# Patient Record
Sex: FEMALE | Race: WHITE | HISPANIC OR LATINO | Employment: UNEMPLOYED | URBAN - METROPOLITAN AREA
[De-identification: names, ages, dates, MRNs, and addresses within clinical notes are randomized per-mention and may not be internally consistent; named-entity substitution may affect disease eponyms.]

---

## 2019-09-06 LAB
ABO GROUP BLD: NORMAL
HBV SURFACE AG SERPL QL IA: NEGATIVE
HIV 1+2 AB+HIV1 P24 AG SERPL QL IA: NON REACTIVE
RUBV IGG SERPL IA-ACNC: NORMAL

## 2019-12-03 ENCOUNTER — INITIAL PRENATAL (OUTPATIENT)
Dept: OBGYN | Facility: CLINIC | Age: 29
End: 2019-12-03
Payer: COMMERCIAL

## 2019-12-03 VITALS — WEIGHT: 179 LBS | SYSTOLIC BLOOD PRESSURE: 107 MMHG | DIASTOLIC BLOOD PRESSURE: 70 MMHG

## 2019-12-03 DIAGNOSIS — Z34.80 SUPERVISION OF OTHER NORMAL PREGNANCY: ICD-10-CM

## 2019-12-03 PROCEDURE — 59401 PR NEW OB VISIT: CPT | Performed by: NURSE PRACTITIONER

## 2019-12-03 RX ORDER — ONDANSETRON 4 MG/1
4 TABLET, FILM COATED ORAL EVERY 4 HOURS PRN
Qty: 30 TAB | Refills: 5 | Status: SHIPPED | OUTPATIENT
Start: 2019-12-03 | End: 2020-01-02

## 2019-12-03 RX ORDER — ONDANSETRON 4 MG/1
4 TABLET, ORALLY DISINTEGRATING ORAL EVERY 6 HOURS PRN
Status: ON HOLD | COMMUNITY
End: 2020-03-07

## 2019-12-03 SDOH — HEALTH STABILITY: MENTAL HEALTH: HOW OFTEN DO YOU HAVE A DRINK CONTAINING ALCOHOL?: NEVER

## 2019-12-03 NOTE — PROGRESS NOTES
Subjective:   Gisela Dykes is a 29 y.o.  who presents for her new OB exam.  She is 25w3d with an DOROTHY of Estimated Date of Delivery: 3/14/20 by LMP she was tracking and is sure of. She is feeling well and has no concerns at this time. Denies VB, LOF, contractions or pain. No ER visits or previous care in this pregnancy. Denies dysuria, vaginal DC, fever. Reports fetal movement.  AFP was normal [place].  Declines CF.  Started care in [place], [place] at around 8 weeks.     History reviewed. No pertinent past medical history.    Psych Hx: Patient denies any history of depression, anxiety, PTSD, bipolar or any other psychological issues.     History reviewed. No pertinent surgical history.     OB History    Para Term  AB Living   2         1   SAB TAB Ectopic Molar Multiple Live Births             1      # Outcome Date GA Lbr Osvaldo/2nd Weight Sex Delivery Anes PTL Lv   2 Current            1  18   3.118 kg (6 lb 14 oz) M Vag-Spont EPI          Gynecological Hx: Denies any hx of STIs, including HSV. Denies any vulvovaginal disorders and no hx of abnormal cervical cytology. Last pap WNL a year ago.     Sexual Hx: One current male partner, who is FOB     Contraceptive Hx: Has used pills in the past and has since discontinued use.     History reviewed. No pertinent family history.     Denies any genetic disorders in family history.     Social History     Socioeconomic History   • Marital status: Single     Spouse name: Not on file   • Number of children: Not on file   • Years of education: Not on file   • Highest education level: Not on file   Occupational History   • Not on file   Social Needs   • Financial resource strain: Not on file   • Food insecurity:     Worry: Not on file     Inability: Not on file   • Transportation needs:     Medical: Not on file     Non-medical: Not on file   Tobacco Use   • Smoking status: Never Smoker   • Smokeless tobacco: Never Used   Substance and Sexual Activity   •  Alcohol use: Not Currently     Frequency: Never   • Drug use: Not on file   • Sexual activity: Yes     Partners: Male   Lifestyle   • Physical activity:     Days per week: Not on file     Minutes per session: Not on file   • Stress: Not on file   Relationships   • Social connections:     Talks on phone: Not on file     Gets together: Not on file     Attends Confucianist service: Not on file     Active member of club or organization: Not on file     Attends meetings of clubs or organizations: Not on file     Relationship status: Not on file   • Intimate partner violence:     Fear of current or ex partner: Not on file     Emotionally abused: Not on file     Physically abused: Not on file     Forced sexual activity: Not on file   Other Topics Concern   • Not on file   Social History Narrative   • Not on file       FOB is involved and lives with Gisela Dykes.  Pregnancy is planned.    She is currently as seasonal at [facility], denies any heavy lifting or exposure to potential teratogens like environmental or occupational toxins.   Denies alcohol use, drug use, or tobacco use in pregnancy.   Denies any current or hx of sexual, emotional or physical abuse or trauma.     Current Medications: PNV, zofran and reglan PRN  Allergies: Denies allergies to medications, food, or environmental allergies.     Objective:      Vitals:    12/03/19 1043   BP: 107/70   Weight: 81.2 kg (179 lb)        See Prenatal Physical and Prenatal Vitals  UA WNL today      Assessment:      1.  IUP @ 25w3d per LMP      2.  S=D      3.  See problem list as follows       Patient Active Problem List    Diagnosis Date Noted   • Supervision of other normal pregnancy 12/03/2019         Plan:   - GC/CT & pap records requested from [place]  - Refill on Zofran   - Prenatal labs ordered - lab slip given  - Discussed PNV, nutrition, adequate water intake, and exercise/weight gain in pregnancy  - NOB informational packet with anticipatory guidance given  - S/sx of  pregnancy warning signs and PTL precautions given  - Complete OB US records requested as done at 20 weeks  - Return to [facility] in 4 wks

## 2019-12-03 NOTE — PROGRESS NOTES
Pt. Here for NOB visit today.  #[phone]  First prenatal care  Pt. States doing well  Pharmacy verified  Declined AFP

## 2019-12-30 ENCOUNTER — HOSPITAL ENCOUNTER (OUTPATIENT)
Dept: LAB | Facility: MEDICAL CENTER | Age: 29
End: 2019-12-30
Attending: NURSE PRACTITIONER
Payer: COMMERCIAL

## 2019-12-30 DIAGNOSIS — Z34.80 SUPERVISION OF OTHER NORMAL PREGNANCY: ICD-10-CM

## 2019-12-30 LAB
ERYTHROCYTE [DISTWIDTH] IN BLOOD BY AUTOMATED COUNT: 41.3 FL (ref 35.9–50)
GLUCOSE 1H P 50 G GLC PO SERPL-MCNC: 154 MG/DL (ref 70–139)
HCT VFR BLD AUTO: 38.1 % (ref 37–47)
HGB BLD-MCNC: 12.7 G/DL (ref 12–16)
MCH RBC QN AUTO: 29.7 PG (ref 27–33)
MCHC RBC AUTO-ENTMCNC: 33.3 G/DL (ref 33.6–35)
MCV RBC AUTO: 89.2 FL (ref 81.4–97.8)
PLATELET # BLD AUTO: 275 K/UL (ref 164–446)
PMV BLD AUTO: 10.6 FL (ref 9–12.9)
RBC # BLD AUTO: 4.27 M/UL (ref 4.2–5.4)
TREPONEMA PALLIDUM IGG+IGM AB [PRESENCE] IN SERUM OR PLASMA BY IMMUNOASSAY: NON REACTIVE
WBC # BLD AUTO: 8 K/UL (ref 4.8–10.8)

## 2019-12-30 PROCEDURE — 85027 COMPLETE CBC AUTOMATED: CPT

## 2019-12-30 PROCEDURE — 36415 COLL VENOUS BLD VENIPUNCTURE: CPT

## 2019-12-30 PROCEDURE — 82950 GLUCOSE TEST: CPT

## 2019-12-30 PROCEDURE — 86780 TREPONEMA PALLIDUM: CPT

## 2019-12-31 ENCOUNTER — ROUTINE PRENATAL (OUTPATIENT)
Dept: OBGYN | Facility: CLINIC | Age: 29
End: 2019-12-31
Payer: COMMERCIAL

## 2019-12-31 VITALS — WEIGHT: 180 LBS | SYSTOLIC BLOOD PRESSURE: 100 MMHG | DIASTOLIC BLOOD PRESSURE: 70 MMHG

## 2019-12-31 DIAGNOSIS — R73.09 ELEVATED GLUCOSE: ICD-10-CM

## 2019-12-31 DIAGNOSIS — Z34.80 SUPERVISION OF OTHER NORMAL PREGNANCY: Primary | ICD-10-CM

## 2019-12-31 PROCEDURE — 90040 PR PRENATAL FOLLOW UP: CPT | Performed by: NURSE PRACTITIONER

## 2019-12-31 NOTE — PROGRESS NOTES
S) Pt is a 29 y.o.   at 29w3d  gestation. Routine prenatal care today. Pt reports some intermittent dizziness especially when she is standing for long periods of time.  Resolves when she sits down.  Denies SOB, chest pain or other symptoms. Discussed normalcy of this during pregnancy.    Fetal movement Normal  Cramping no  VB no  LOF no   Denies dysuria. Generally feels well today. Good self-care activities identified. Denies headaches, swelling, visual changes, or epigastric pain .     O) /70   Wt 81.6 kg (180 lb)         Labs: WNL       PNL: WNL       GCT: elevated 1 hour       AFP: negative       GBS: n/a       Pertinent ultrasound - see media  Anatomy scan WNL           A) IUP at 29w3d       S=D         Patient Active Problem List    Diagnosis Date Noted   • Supervision of other normal pregnancy 2019          SVE: deferred         TDAP: no       FLU: no       BTL: no       : n/a       C/S Consent: n/a       IOL or C/S scheduled: no       LAST PAP:          P) s/s ptl vs general discomforts. Fetal movements reviewed. General ed and anticipatory guidance. Nutrition/exercise/vitamin.   Discussed increasing water intake, eating small frequent snacks through day and rest to help with dizziness.  Declined TDAP  RTC 2 weeks or PRN.

## 2019-12-31 NOTE — LETTER
"Count Your Baby's Movements  Another step to a healthy delivery               Dept: [phone]    How Many Weeks Pregnant? 29w3d    Date to Begin Countin19              How to use this chart    One way for your physician to keep track of your baby's health is by knowing how often the baby moves (or \"kicks\") in your womb.  You can help your physician to do this by using this chart every day.    Every day, you should see how many hours it takes for your baby to move 10 times.  Start in the morning, as soon as you get up.    · First, write down the time your baby moves until you get to 10.  · Check off one box every time your baby moves until you get to 10.  · Write down the time you finished counting in the last column.  · Total how long it took to count up all 10 movements.  · Finally, fill in the box that shows how long this took.  After counting 10 movements, you no longer have to count any more that day.  The next morning, just start counting again as soon as you get up.    What should you call a \"movement\"?  It is hard to say, because it will feel different from one mother to another and from one pregnancy to the next.  The important thing is that you count the movements the same way throughout your pregnancy.  If you have more questions, you should ask your physician.    Count carefully every day!  SAMPLE:  Week 28    How many hours did it take to feel 10 movements?       Start  Time     1     2     3     4     5     6     7     8     9     10   Finish Time   Mon 8:20 ·  ·  ·  ·  ·  ·  ·  ·  ·  ·  11:40                  Sat               Sun                 IMPORTANT: You should contact your physician if it takes more than two hours for you to feel 10 movements.  Each morning, write down the time and start to count the movements of your baby.  Keep track by checking off one box every time you feel one movement.  When you have felt 10 \"kicks\", " write down the time you finished counting in the last column.  Then fill in the   box (over the check chelita) for the number of hours it took.  Be sure to read the complete instructions on the previous page.

## 2019-12-31 NOTE — PROGRESS NOTES
Pt here today for OB follow up  Pt states she has been dizzy for a month and a half  Reports +FM  Good # [phone]  Pharmacy Confirmed.  Chaperone offered and declined,  Declined Tdap   K/C given

## 2020-01-10 ENCOUNTER — TELEPHONE (OUTPATIENT)
Dept: OBGYN | Facility: CLINIC | Age: 30
End: 2020-01-10

## 2020-01-10 NOTE — TELEPHONE ENCOUNTER
Pt notified of elevated 1 hr GTT. Informed pt she would need a 3 hr GTT for further evaluation. Instructed pt that with this test she would need to be fasting, but could drink water. Let her know she would not be able to leave lab during test and she could take a snack for after test was completed. Pt had no further questions or concerns.   ----- Message from NIRMAL Simon sent at 12/31/2019  7:56 AM PST -----  Pt needs three hour. Ordered

## 2020-01-13 ENCOUNTER — HOSPITAL ENCOUNTER (OUTPATIENT)
Dept: LAB | Facility: MEDICAL CENTER | Age: 30
End: 2020-01-13
Attending: NURSE PRACTITIONER
Payer: COMMERCIAL

## 2020-01-13 DIAGNOSIS — R73.09 ELEVATED GLUCOSE: ICD-10-CM

## 2020-01-13 LAB
GLUCOSE 1H P CHAL SERPL-MCNC: 129 MG/DL (ref 65–180)
GLUCOSE 2H P CHAL SERPL-MCNC: 113 MG/DL (ref 65–155)
GLUCOSE 3H P CHAL SERPL-MCNC: 113 MG/DL (ref 65–140)
GLUCOSE BS SERPL-MCNC: 78 MG/DL (ref 65–95)

## 2020-01-13 PROCEDURE — 82952 GTT-ADDED SAMPLES: CPT

## 2020-01-13 PROCEDURE — 36415 COLL VENOUS BLD VENIPUNCTURE: CPT

## 2020-01-13 PROCEDURE — 82951 GLUCOSE TOLERANCE TEST (GTT): CPT

## 2020-01-14 ENCOUNTER — ROUTINE PRENATAL (OUTPATIENT)
Dept: OBGYN | Facility: CLINIC | Age: 30
End: 2020-01-14
Payer: COMMERCIAL

## 2020-01-14 ENCOUNTER — HOSPITAL ENCOUNTER (OUTPATIENT)
Facility: MEDICAL CENTER | Age: 30
End: 2020-01-14
Attending: NURSE PRACTITIONER
Payer: COMMERCIAL

## 2020-01-14 VITALS — WEIGHT: 179 LBS | HEART RATE: 86 BPM | DIASTOLIC BLOOD PRESSURE: 74 MMHG | SYSTOLIC BLOOD PRESSURE: 108 MMHG

## 2020-01-14 DIAGNOSIS — Z34.80 SUPERVISION OF OTHER NORMAL PREGNANCY: ICD-10-CM

## 2020-01-14 DIAGNOSIS — Z34.80 SUPERVISION OF OTHER NORMAL PREGNANCY: Primary | ICD-10-CM

## 2020-01-14 PROCEDURE — 87491 CHLMYD TRACH DNA AMP PROBE: CPT

## 2020-01-14 PROCEDURE — 90040 PR PRENATAL FOLLOW UP: CPT | Performed by: NURSE PRACTITIONER

## 2020-01-14 PROCEDURE — 87591 N.GONORRHOEAE DNA AMP PROB: CPT

## 2020-01-14 NOTE — PROGRESS NOTES
Pt here today for OB follow up  Pt states no complaints  Reports +FM  Good # [phone]  Pharmacy Confirmed.  Chaperone offered and provided.

## 2020-01-14 NOTE — PROGRESS NOTES
S) Pt is a 29 y.o.   at 31w3d  gestation. Routine prenatal care today. No complaints today. Review of previous records shows no GC/CT on file, and US shows poor visualization of LVOT. Will collect GC/CT today and repeat US. Discussed need for testing, and all questions answered. GBS at 36 weeks.  labor precautions reviewed, all questions answered.    Fetal movement Normal  Cramping no  VB no  LOF no   Denies dysuria. Generally feels well today. Good self-care activities identified. Denies headaches, swelling, visual changes, or epigastric pain .     O) /74   Pulse 86   Wt 81.2 kg (179 lb)         Labs:       PNL: WNL       GCT: 154, but 3 hour WNL        AFP: normal       GBS: N/A       Pertinent ultrasound -        Done at previous practice, see media- LVOT not seen well on exam. Repeat US ordered. Remainder of survey and MOE WNL    A) IUP at 31w3d       S=D         Patient Active Problem List    Diagnosis Date Noted   • Supervision of other normal pregnancy 2019          SVE: deferred       Chaperone offered: yes         TDAP: no       FLU: no        BTL: no       : n/a       C/S Consent: n/a       IOL or C/S scheduled: no       LAST PAP: None on file, will request records as she states last was about 1-2 years ago and normal. May need to repeat PP if none on file         P) s/s ptl vs general discomforts. Fetal movements reviewed. General ed and anticipatory guidance. Nutrition/exercise/vitamin. Plans breast Plans pp contraception- unsure  Continue PNV.

## 2020-01-15 LAB
C TRACH DNA SPEC QL NAA+PROBE: NEGATIVE
N GONORRHOEA DNA SPEC QL NAA+PROBE: NEGATIVE
SPECIMEN SOURCE: NORMAL

## 2020-02-04 ENCOUNTER — ROUTINE PRENATAL (OUTPATIENT)
Dept: OBGYN | Facility: CLINIC | Age: 30
End: 2020-02-04
Payer: COMMERCIAL

## 2020-02-04 ENCOUNTER — HOSPITAL ENCOUNTER (OUTPATIENT)
Dept: RADIOLOGY | Facility: MEDICAL CENTER | Age: 30
End: 2020-02-04
Attending: NURSE PRACTITIONER
Payer: COMMERCIAL

## 2020-02-04 VITALS — WEIGHT: 183 LBS | SYSTOLIC BLOOD PRESSURE: 120 MMHG | DIASTOLIC BLOOD PRESSURE: 72 MMHG

## 2020-02-04 DIAGNOSIS — Z34.80 SUPERVISION OF OTHER NORMAL PREGNANCY: ICD-10-CM

## 2020-02-04 PROCEDURE — 90040 PR PRENATAL FOLLOW UP: CPT | Performed by: OBSTETRICS & GYNECOLOGY

## 2020-02-04 PROCEDURE — 76815 OB US LIMITED FETUS(S): CPT

## 2020-02-04 NOTE — PROGRESS NOTES
Chief complaint: Return OB visit    S: Pt presents for routine OB follow up. Good fetal movement.  No contractions, vaginal bleeding, or leakage of fluid.    Questions answered.  No complaints    O: /72   Wt 83 kg (183 lb)   LMP 2019   Patients' weight gain, fluid intake and exercise level discussed.  Vitals, fundal height , fetal position, and FHR reviewed on flowsheet    Lab:No results found for this or any previous visit (from the past 336 hour(s)).    A/P:  29 y.o.  at 34w3d presents for routine obstetric follow-up.  Size equals dates and/or scan    1.  Continue prenatal vitamins.  2.  Fetal kick counts.  3.  Exercise at least 30 minutes daily.  4.  Drink at least 2L of water daily  5.  Labor precautions educated.  6.  Follow-up in 1 weeks.  7.  GBS 36 weeks    All questions answered    Follow-up 1 week

## 2020-02-04 NOTE — PROGRESS NOTES
Pt here today for OB follow up  Pt states some minor cramping in lower belly  Pt denies VB, LOF, and contractions   Reports +FM  Good # [phone]  Pharmacy Confirmed.   Chaperone offered and provided.

## 2020-02-05 DIAGNOSIS — Z34.80 SUPERVISION OF OTHER NORMAL PREGNANCY: ICD-10-CM

## 2020-02-06 ENCOUNTER — TELEPHONE (OUTPATIENT)
Dept: OBGYN | Facility: CLINIC | Age: 30
End: 2020-02-06

## 2020-02-06 NOTE — TELEPHONE ENCOUNTER
----- Message from Lucie Faria C.N.M. sent at 2/5/2020  7:44 AM PST -----  Please let patient know that they were able to see the heart and that looks great, but still not able to see the baby's face due to position. Will order another US to look at facial structures again. Please try to schedule this in the next 2-3 weeks    Pt notified, instructed to call to set up an appt.     Verbalized understanding

## 2020-02-11 ENCOUNTER — ROUTINE PRENATAL (OUTPATIENT)
Dept: OBGYN | Facility: CLINIC | Age: 30
End: 2020-02-11
Payer: COMMERCIAL

## 2020-02-11 VITALS — SYSTOLIC BLOOD PRESSURE: 123 MMHG | DIASTOLIC BLOOD PRESSURE: 79 MMHG | WEIGHT: 181 LBS

## 2020-02-11 DIAGNOSIS — Z34.83 ENCOUNTER FOR SUPERVISION OF OTHER NORMAL PREGNANCY IN THIRD TRIMESTER: ICD-10-CM

## 2020-02-11 PROCEDURE — 90040 PR PRENATAL FOLLOW UP: CPT | Performed by: OBSTETRICS & GYNECOLOGY

## 2020-02-11 NOTE — PROGRESS NOTES
Pt here today for OB follow up @ 35w3d  Pt states denies VB and LOF  Reports +FM  Good # [phone]  Pharmacy Confirmed.

## 2020-02-11 NOTE — PROGRESS NOTES
CARLOS:  35w3d    Pt reports doing well.   Denies vaginal bleeding, contractions, LOF.  Patient reports she had a fever on Saturday to 1015 which then spontaneously resolved.  Was associated with nausea and vomiting.  No fevers or any concerns since that time, endorses normal fetal movement now and through the episode    /79   Wt 82.1 kg (181 lb)   LMP 2019   gen: AAO, NAD  FHTs: 155  FH: 35    A/P: 29 y.o.  @ 35w3d  - PNL up to date, Rh+/-  - had f/u imaging here for non-visualized cardiac anatomy previously; face poorly visualized; has f/u US for this although discussed I do not anticipate they will be able to see well at this gestational age; could do repeat US as scheduled or defer if prefers - assess fetal face on delivery.  pt prefers to wait until delivery and avoid additional US  -Considering pills for postpartum contraception, undecided  - elevated 1hr, normal 3hr; 3rd tri CBC/T pall wnl  - declines Tdap      Reviewed labor precautions to come to L&D if with decreased fetal movement, contractions, loss of fluid, vaginal bleeding.  Also would recommend further evaluation if return of fever.      RTC 1wk, GBS that visit      Thao Jones MD  [facility], Women's Health

## 2020-02-18 ENCOUNTER — HOSPITAL ENCOUNTER (OUTPATIENT)
Facility: MEDICAL CENTER | Age: 30
End: 2020-02-18
Attending: OBSTETRICS & GYNECOLOGY
Payer: COMMERCIAL

## 2020-02-18 ENCOUNTER — ROUTINE PRENATAL (OUTPATIENT)
Dept: OBGYN | Facility: CLINIC | Age: 30
End: 2020-02-18
Payer: COMMERCIAL

## 2020-02-18 VITALS — WEIGHT: 181 LBS | DIASTOLIC BLOOD PRESSURE: 72 MMHG | SYSTOLIC BLOOD PRESSURE: 127 MMHG

## 2020-02-18 DIAGNOSIS — Z34.83 ENCOUNTER FOR SUPERVISION OF OTHER NORMAL PREGNANCY IN THIRD TRIMESTER: ICD-10-CM

## 2020-02-18 PROCEDURE — 90040 PR PRENATAL FOLLOW UP: CPT | Performed by: OBSTETRICS & GYNECOLOGY

## 2020-02-18 PROCEDURE — 87081 CULTURE SCREEN ONLY: CPT

## 2020-02-18 PROCEDURE — 87150 DNA/RNA AMPLIFIED PROBE: CPT

## 2020-02-18 NOTE — PROGRESS NOTES
29 y.o.  36w3d The patient is here for routine obstetrical followup. She reports good fetal movement. She denies contractions, vaginal bleeding, or leaking of fluid.    The patient's pregnancy is complicated by   Patient Active Problem List    Diagnosis Date Noted   • Supervision of other normal pregnancy 2019   GBS obtained      Followup in 1 week   labor precautions were discussed with patient  Fetal kick counts were discussed with patient

## 2020-02-19 ENCOUNTER — APPOINTMENT (OUTPATIENT)
Dept: OTHER | Facility: IMAGING CENTER | Age: 30
End: 2020-02-19
Payer: COMMERCIAL

## 2020-02-19 DIAGNOSIS — Z34.83 ENCOUNTER FOR SUPERVISION OF OTHER NORMAL PREGNANCY IN THIRD TRIMESTER: ICD-10-CM

## 2020-02-20 LAB — GP B STREP DNA SPEC QL NAA+PROBE: NEGATIVE

## 2020-02-26 ENCOUNTER — ROUTINE PRENATAL (OUTPATIENT)
Dept: OBGYN | Facility: CLINIC | Age: 30
End: 2020-02-26
Payer: COMMERCIAL

## 2020-02-26 VITALS — WEIGHT: 181 LBS | DIASTOLIC BLOOD PRESSURE: 73 MMHG | SYSTOLIC BLOOD PRESSURE: 115 MMHG

## 2020-02-26 DIAGNOSIS — Z34.80 SUPERVISION OF OTHER NORMAL PREGNANCY: ICD-10-CM

## 2020-02-26 PROCEDURE — 90040 PR PRENATAL FOLLOW UP: CPT | Performed by: OBSTETRICS & GYNECOLOGY

## 2020-02-26 NOTE — PROGRESS NOTES
CARLOS:  37w4d    Pt reports doing well.  Denies vaginal bleeding, contractions, LOF.  Reports +FM.    LMP 2019   gen: AAO, NAD  FHTs: 135  FH: 38    Prenatal labs 2019  H/H 14  Platelets 268  Blood type a positive  RPR nonreactive  Hep B nonreactive  Rubella immune  HIV nonreactive  Ultrasound at 13 weeks 5 days with normal nuchal translucency    A/P: 29 y.o.  @ 37w4d   #Prenatal care. PNL up to date, Rh+, glucola/3rd tri CBC.  Prenatal labs as noted above   # FWB.  anatomy US wnl, FH appropriate, FHT + today, continue kick counts  # Labor precautions discussed  #Delivery plan: await spontaneous labor, discussed induction at 41 weeks with has not gone into labor prior.  Not yet scheduled   #Postpartum planning.  plans to BF, PPBCM discussed, will likely want IUD.  #RTC 2wks

## 2020-02-26 NOTE — PROGRESS NOTES
Pt here today for OB follow up  Pt states no complaints   Reports +FM  Good # [phone]  Pharmacy Confirmed.  Chaperone offered and provided.

## 2020-03-03 ENCOUNTER — ROUTINE PRENATAL (OUTPATIENT)
Dept: OBGYN | Facility: CLINIC | Age: 30
End: 2020-03-03
Payer: COMMERCIAL

## 2020-03-03 VITALS — DIASTOLIC BLOOD PRESSURE: 85 MMHG | SYSTOLIC BLOOD PRESSURE: 129 MMHG | WEIGHT: 180 LBS

## 2020-03-03 DIAGNOSIS — Z34.80 SUPERVISION OF OTHER NORMAL PREGNANCY: ICD-10-CM

## 2020-03-03 PROCEDURE — 90040 PR PRENATAL FOLLOW UP: CPT | Performed by: OBSTETRICS & GYNECOLOGY

## 2020-03-03 NOTE — PROGRESS NOTES
Pt here today for OB follow up @ 38w3d  Pt states denies VB and LOF. Pt wants to do 39wk IOL   Reports +FM  Good # [phone]  Pharmacy Confirmed.  Labs up to date

## 2020-03-03 NOTE — PROGRESS NOTES
Chief complaint: Return OB visit    S: Pt presents for routine OB follow up. Good fetal movement.  No contractions, vaginal bleeding, or leakage of fluid.    Questions answered.    O: /85   Wt 81.6 kg (180 lb)   LMP 2019   Patients' weight gain, fluid intake and exercise level discussed.  Vitals, fundal height , fetal position, and FHR reviewed on flowsheet    Lab:  Recent Results (from the past 336 hour(s))   GRP B STREP, BY PCR (ROQUE BROTH)    Collection Time: 20  5:31 PM   Result Value Ref Range    Strep Gp B DNA PCR Negative Negative       A/P:  29 y.o.  at 38w3d presents for routine obstetric follow-up.  Size equals dates and/or scan    1.  Continue prenatal vitamins.  2.  Fetal kick counts.  3.  Exercise at least 30 minutes daily.  4.  Drink at least 2L of water daily  5.  Labor precautions educated.  6.  Follow-up in 1 weeks.  7.  GBS negative    Cervix 250/-3.  Patient requesting induction of labor due to family issues.  Reports her  is starting work very soon and he has no paternity leave available.  Request sent to labor delivery    All questions answered

## 2020-03-07 ENCOUNTER — ANESTHESIA EVENT (OUTPATIENT)
Dept: ANESTHESIOLOGY | Facility: MEDICAL CENTER | Age: 30
End: 2020-03-07
Payer: COMMERCIAL

## 2020-03-07 ENCOUNTER — ANESTHESIA (OUTPATIENT)
Dept: ANESTHESIOLOGY | Facility: MEDICAL CENTER | Age: 30
End: 2020-03-07
Payer: COMMERCIAL

## 2020-03-07 ENCOUNTER — APPOINTMENT (OUTPATIENT)
Dept: OBGYN | Facility: MEDICAL CENTER | Age: 30
End: 2020-03-07
Attending: OBSTETRICS & GYNECOLOGY
Payer: COMMERCIAL

## 2020-03-07 ENCOUNTER — HOSPITAL ENCOUNTER (INPATIENT)
Facility: MEDICAL CENTER | Age: 30
LOS: 1 days | End: 2020-03-08
Attending: OBSTETRICS & GYNECOLOGY | Admitting: OBSTETRICS & GYNECOLOGY
Payer: COMMERCIAL

## 2020-03-07 PROBLEM — Z34.90 PREGNANT: Status: ACTIVE | Noted: 2019-12-03

## 2020-03-07 LAB
BASOPHILS # BLD AUTO: 0.3 % (ref 0–1.8)
BASOPHILS # BLD: 0.02 K/UL (ref 0–0.12)
EOSINOPHIL # BLD AUTO: 0.02 K/UL (ref 0–0.51)
EOSINOPHIL NFR BLD: 0.3 % (ref 0–6.9)
ERYTHROCYTE [DISTWIDTH] IN BLOOD BY AUTOMATED COUNT: 40.8 FL (ref 35.9–50)
HCT VFR BLD AUTO: 37.5 % (ref 37–47)
HGB BLD-MCNC: 12.5 G/DL (ref 12–16)
HOLDING TUBE BB 8507: NORMAL
IMM GRANULOCYTES # BLD AUTO: 0.03 K/UL (ref 0–0.11)
IMM GRANULOCYTES NFR BLD AUTO: 0.4 % (ref 0–0.9)
LYMPHOCYTES # BLD AUTO: 2.03 K/UL (ref 1–4.8)
LYMPHOCYTES NFR BLD: 30.2 % (ref 22–41)
MCH RBC QN AUTO: 27.9 PG (ref 27–33)
MCHC RBC AUTO-ENTMCNC: 33.3 G/DL (ref 33.6–35)
MCV RBC AUTO: 83.7 FL (ref 81.4–97.8)
MONOCYTES # BLD AUTO: 0.39 K/UL (ref 0–0.85)
MONOCYTES NFR BLD AUTO: 5.8 % (ref 0–13.4)
NEUTROPHILS # BLD AUTO: 4.24 K/UL (ref 2–7.15)
NEUTROPHILS NFR BLD: 63 % (ref 44–72)
NRBC # BLD AUTO: 0 K/UL
NRBC BLD-RTO: 0 /100 WBC
PLATELET # BLD AUTO: 188 K/UL (ref 164–446)
PMV BLD AUTO: 12 FL (ref 9–12.9)
RBC # BLD AUTO: 4.48 M/UL (ref 4.2–5.4)
WBC # BLD AUTO: 6.7 K/UL (ref 4.8–10.8)

## 2020-03-07 PROCEDURE — 770002 HCHG ROOM/CARE - OB PRIVATE (112)

## 2020-03-07 PROCEDURE — 304965 HCHG RECOVERY SERVICES

## 2020-03-07 PROCEDURE — 0KQM0ZZ REPAIR PERINEUM MUSCLE, OPEN APPROACH: ICD-10-PCS | Performed by: OBSTETRICS & GYNECOLOGY

## 2020-03-07 PROCEDURE — 85025 COMPLETE CBC W/AUTO DIFF WBC: CPT

## 2020-03-07 PROCEDURE — 700111 HCHG RX REV CODE 636 W/ 250 OVERRIDE (IP): Performed by: ANESTHESIOLOGY

## 2020-03-07 PROCEDURE — 10907ZC DRAINAGE OF AMNIOTIC FLUID, THERAPEUTIC FROM PRODUCTS OF CONCEPTION, VIA NATURAL OR ARTIFICIAL OPENING: ICD-10-PCS | Performed by: OBSTETRICS & GYNECOLOGY

## 2020-03-07 PROCEDURE — 59400 OBSTETRICAL CARE: CPT | Performed by: OBSTETRICS & GYNECOLOGY

## 2020-03-07 PROCEDURE — 700111 HCHG RX REV CODE 636 W/ 250 OVERRIDE (IP)

## 2020-03-07 PROCEDURE — 700105 HCHG RX REV CODE 258

## 2020-03-07 PROCEDURE — 303615 HCHG EPIDURAL/SPINAL ANESTHESIA FOR LABOR

## 2020-03-07 PROCEDURE — 700105 HCHG RX REV CODE 258: Performed by: STUDENT IN AN ORGANIZED HEALTH CARE EDUCATION/TRAINING PROGRAM

## 2020-03-07 PROCEDURE — 700102 HCHG RX REV CODE 250 W/ 637 OVERRIDE(OP): Performed by: STUDENT IN AN ORGANIZED HEALTH CARE EDUCATION/TRAINING PROGRAM

## 2020-03-07 PROCEDURE — 59409 OBSTETRICAL CARE: CPT

## 2020-03-07 PROCEDURE — 36415 COLL VENOUS BLD VENIPUNCTURE: CPT

## 2020-03-07 PROCEDURE — 700111 HCHG RX REV CODE 636 W/ 250 OVERRIDE (IP): Performed by: STUDENT IN AN ORGANIZED HEALTH CARE EDUCATION/TRAINING PROGRAM

## 2020-03-07 PROCEDURE — 700101 HCHG RX REV CODE 250: Performed by: ANESTHESIOLOGY

## 2020-03-07 PROCEDURE — A9270 NON-COVERED ITEM OR SERVICE: HCPCS | Performed by: STUDENT IN AN ORGANIZED HEALTH CARE EDUCATION/TRAINING PROGRAM

## 2020-03-07 RX ORDER — LIDOCAINE HYDROCHLORIDE AND EPINEPHRINE 15; 5 MG/ML; UG/ML
INJECTION, SOLUTION EPIDURAL
Status: COMPLETED | OUTPATIENT
Start: 2020-03-07 | End: 2020-03-07

## 2020-03-07 RX ORDER — ROPIVACAINE HYDROCHLORIDE 2 MG/ML
INJECTION, SOLUTION EPIDURAL; INFILTRATION; PERINEURAL CONTINUOUS
Status: DISCONTINUED | OUTPATIENT
Start: 2020-03-07 | End: 2020-03-08 | Stop reason: HOSPADM

## 2020-03-07 RX ORDER — ONDANSETRON 2 MG/ML
4 INJECTION INTRAMUSCULAR; INTRAVENOUS EVERY 4 HOURS PRN
Status: DISCONTINUED | OUTPATIENT
Start: 2020-03-07 | End: 2020-03-08 | Stop reason: HOSPADM

## 2020-03-07 RX ORDER — SODIUM CHLORIDE, SODIUM LACTATE, POTASSIUM CHLORIDE, AND CALCIUM CHLORIDE .6; .31; .03; .02 G/100ML; G/100ML; G/100ML; G/100ML
1000 INJECTION, SOLUTION INTRAVENOUS
Status: DISCONTINUED | OUTPATIENT
Start: 2020-03-07 | End: 2020-03-07 | Stop reason: HOSPADM

## 2020-03-07 RX ORDER — MISOPROSTOL 200 UG/1
800 TABLET ORAL
Status: DISCONTINUED | OUTPATIENT
Start: 2020-03-07 | End: 2020-03-07 | Stop reason: HOSPADM

## 2020-03-07 RX ORDER — ACETAMINOPHEN 325 MG/1
650 TABLET ORAL EVERY 4 HOURS PRN
Status: DISCONTINUED | OUTPATIENT
Start: 2020-03-07 | End: 2020-03-08 | Stop reason: HOSPADM

## 2020-03-07 RX ORDER — SODIUM CHLORIDE, SODIUM LACTATE, POTASSIUM CHLORIDE, AND CALCIUM CHLORIDE .6; .31; .03; .02 G/100ML; G/100ML; G/100ML; G/100ML
250 INJECTION, SOLUTION INTRAVENOUS PRN
Status: DISCONTINUED | OUTPATIENT
Start: 2020-03-07 | End: 2020-03-07 | Stop reason: HOSPADM

## 2020-03-07 RX ORDER — VITAMIN A ACETATE, BETA CAROTENE, ASCORBIC ACID, CHOLECALCIFEROL, .ALPHA.-TOCOPHEROL ACETATE, DL-, THIAMINE MONONITRATE, RIBOFLAVIN, NIACINAMIDE, PYRIDOXINE HYDROCHLORIDE, FOLIC ACID, CYANOCOBALAMIN, CALCIUM CARBONATE, FERROUS FUMARATE, ZINC OXIDE, CUPRIC OXIDE 3080; 12; 120; 400; 1; 1.84; 3; 20; 22; 920; 25; 200; 27; 10; 2 [IU]/1; UG/1; MG/1; [IU]/1; MG/1; MG/1; MG/1; MG/1; MG/1; [IU]/1; MG/1; MG/1; MG/1; MG/1; MG/1
1 TABLET, FILM COATED ORAL EVERY MORNING
Status: DISCONTINUED | OUTPATIENT
Start: 2020-03-07 | End: 2020-03-08

## 2020-03-07 RX ORDER — DEXTROSE, SODIUM CHLORIDE, SODIUM LACTATE, POTASSIUM CHLORIDE, AND CALCIUM CHLORIDE 5; .6; .31; .03; .02 G/100ML; G/100ML; G/100ML; G/100ML; G/100ML
INJECTION, SOLUTION INTRAVENOUS CONTINUOUS
Status: DISCONTINUED | OUTPATIENT
Start: 2020-03-07 | End: 2020-03-08 | Stop reason: HOSPADM

## 2020-03-07 RX ORDER — CARBOPROST TROMETHAMINE 250 UG/ML
250 INJECTION, SOLUTION INTRAMUSCULAR
Status: DISCONTINUED | OUTPATIENT
Start: 2020-03-07 | End: 2020-03-07 | Stop reason: HOSPADM

## 2020-03-07 RX ORDER — VITAMIN A ACETATE, BETA CAROTENE, ASCORBIC ACID, CHOLECALCIFEROL, .ALPHA.-TOCOPHEROL ACETATE, DL-, THIAMINE MONONITRATE, RIBOFLAVIN, NIACINAMIDE, PYRIDOXINE HYDROCHLORIDE, FOLIC ACID, CYANOCOBALAMIN, CALCIUM CARBONATE, FERROUS FUMARATE, ZINC OXIDE, CUPRIC OXIDE 3080; 12; 120; 400; 1; 1.84; 3; 20; 22; 920; 25; 200; 27; 10; 2 [IU]/1; UG/1; MG/1; [IU]/1; MG/1; MG/1; MG/1; MG/1; MG/1; [IU]/1; MG/1; MG/1; MG/1; MG/1; MG/1
1 TABLET, FILM COATED ORAL EVERY MORNING
Status: CANCELLED | OUTPATIENT
Start: 2020-03-07

## 2020-03-07 RX ORDER — IBUPROFEN 800 MG/1
800 TABLET ORAL EVERY 6 HOURS PRN
Status: DISCONTINUED | OUTPATIENT
Start: 2020-03-07 | End: 2020-03-08 | Stop reason: HOSPADM

## 2020-03-07 RX ORDER — ALUMINA, MAGNESIA, AND SIMETHICONE 2400; 2400; 240 MG/30ML; MG/30ML; MG/30ML
30 SUSPENSION ORAL EVERY 6 HOURS PRN
Status: DISCONTINUED | OUTPATIENT
Start: 2020-03-07 | End: 2020-03-07 | Stop reason: HOSPADM

## 2020-03-07 RX ORDER — ROPIVACAINE HYDROCHLORIDE 2 MG/ML
INJECTION, SOLUTION EPIDURAL; INFILTRATION; PERINEURAL
Status: COMPLETED
Start: 2020-03-07 | End: 2020-03-07

## 2020-03-07 RX ORDER — METHYLERGONOVINE MALEATE 0.2 MG/ML
0.2 INJECTION INTRAVENOUS
Status: DISCONTINUED | OUTPATIENT
Start: 2020-03-07 | End: 2020-03-07 | Stop reason: HOSPADM

## 2020-03-07 RX ORDER — BUPIVACAINE HYDROCHLORIDE 2.5 MG/ML
INJECTION, SOLUTION EPIDURAL; INFILTRATION; INTRACAUDAL
Status: ACTIVE
Start: 2020-03-07 | End: 2020-03-08

## 2020-03-07 RX ORDER — SODIUM CHLORIDE, SODIUM LACTATE, POTASSIUM CHLORIDE, CALCIUM CHLORIDE 600; 310; 30; 20 MG/100ML; MG/100ML; MG/100ML; MG/100ML
INJECTION, SOLUTION INTRAVENOUS
Status: COMPLETED
Start: 2020-03-07 | End: 2020-03-07

## 2020-03-07 RX ORDER — SODIUM CHLORIDE, SODIUM LACTATE, POTASSIUM CHLORIDE, CALCIUM CHLORIDE 600; 310; 30; 20 MG/100ML; MG/100ML; MG/100ML; MG/100ML
INJECTION, SOLUTION INTRAVENOUS CONTINUOUS
Status: DISPENSED | OUTPATIENT
Start: 2020-03-07 | End: 2020-03-07

## 2020-03-07 RX ORDER — ONDANSETRON 4 MG/1
4 TABLET, ORALLY DISINTEGRATING ORAL EVERY 6 HOURS PRN
COMMUNITY
End: 2022-04-02

## 2020-03-07 RX ADMIN — OXYTOCIN 2 MILLI-UNITS/MIN: 10 INJECTION, SOLUTION INTRAMUSCULAR; INTRAVENOUS at 12:00

## 2020-03-07 RX ADMIN — LIDOCAINE HYDROCHLORIDE,EPINEPHRINE BITARTRATE 3 ML: 15; .005 INJECTION, SOLUTION EPIDURAL; INFILTRATION; INTRACAUDAL; PERINEURAL at 14:25

## 2020-03-07 RX ADMIN — OXYTOCIN 125 ML/HR: 10 INJECTION, SOLUTION INTRAMUSCULAR; INTRAVENOUS at 19:47

## 2020-03-07 RX ADMIN — SODIUM CHLORIDE, POTASSIUM CHLORIDE, SODIUM LACTATE AND CALCIUM CHLORIDE: 600; 310; 30; 20 INJECTION, SOLUTION INTRAVENOUS at 12:00

## 2020-03-07 RX ADMIN — FENTANYL CITRATE 100 MCG: 50 INJECTION, SOLUTION INTRAMUSCULAR; INTRAVENOUS at 17:31

## 2020-03-07 RX ADMIN — IBUPROFEN 800 MG: 800 TABLET, FILM COATED ORAL at 19:46

## 2020-03-07 RX ADMIN — ROPIVACAINE HYDROCHLORIDE 200 MG: 2 INJECTION, SOLUTION EPIDURAL; INFILTRATION at 14:43

## 2020-03-07 RX ADMIN — ACETAMINOPHEN 650 MG: 325 TABLET, FILM COATED ORAL at 23:08

## 2020-03-07 RX ADMIN — BUPIVACAINE HYDROCHLORIDE 10 ML: 2.5 INJECTION, SOLUTION EPIDURAL; INFILTRATION; INTRACAUDAL; PERINEURAL at 17:31

## 2020-03-07 RX ADMIN — SODIUM CHLORIDE, POTASSIUM CHLORIDE, SODIUM LACTATE AND CALCIUM CHLORIDE: 600; 310; 30; 20 INJECTION, SOLUTION INTRAVENOUS at 14:20

## 2020-03-07 RX ADMIN — SODIUM CHLORIDE, SODIUM LACTATE, POTASSIUM CHLORIDE, CALCIUM CHLORIDE AND DEXTROSE MONOHYDRATE: 5; 600; 310; 30; 20 INJECTION, SOLUTION INTRAVENOUS at 18:12

## 2020-03-07 SDOH — ECONOMIC STABILITY: FOOD INSECURITY: WITHIN THE PAST 12 MONTHS, THE FOOD YOU BOUGHT JUST DIDN'T LAST AND YOU DIDN'T HAVE MONEY TO GET MORE.: PATIENT DECLINED

## 2020-03-07 SDOH — ECONOMIC STABILITY: FOOD INSECURITY: WITHIN THE PAST 12 MONTHS, YOU WORRIED THAT YOUR FOOD WOULD RUN OUT BEFORE YOU GOT MONEY TO BUY MORE.: PATIENT DECLINED

## 2020-03-07 SDOH — ECONOMIC STABILITY: TRANSPORTATION INSECURITY
IN THE PAST 12 MONTHS, HAS LACK OF TRANSPORTATION KEPT YOU FROM MEETINGS, WORK, OR FROM GETTING THINGS NEEDED FOR DAILY LIVING?: PATIENT DECLINED

## 2020-03-07 SDOH — ECONOMIC STABILITY: TRANSPORTATION INSECURITY
IN THE PAST 12 MONTHS, HAS THE LACK OF TRANSPORTATION KEPT YOU FROM MEDICAL APPOINTMENTS OR FROM GETTING MEDICATIONS?: PATIENT DECLINED

## 2020-03-07 ASSESSMENT — PATIENT HEALTH QUESTIONNAIRE - PHQ9
SUM OF ALL RESPONSES TO PHQ9 QUESTIONS 1 AND 2: 0
2. FEELING DOWN, DEPRESSED, IRRITABLE, OR HOPELESS: NOT AT ALL
1. LITTLE INTEREST OR PLEASURE IN DOING THINGS: NOT AT ALL

## 2020-03-07 ASSESSMENT — LIFESTYLE VARIABLES
EVER_SMOKED: NEVER
ALCOHOL_USE: NO
DOES PATIENT WANT TO STOP DRINKING: NO

## 2020-03-07 ASSESSMENT — PAIN SCALES - GENERAL: PAIN_LEVEL: 0

## 2020-03-07 NOTE — ANESTHESIA PREPROCEDURE EVALUATION
Relevant Problems   Other   (+) Pregnant       Physical Exam    Airway   Mallampati: II  TM distance: >3 FB  Neck ROM: full       Cardiovascular - normal exam  Rhythm: regular  Rate: normal  (-) murmur     Dental - normal exam         Pulmonary - normal exam  Breath sounds clear to auscultation     Abdominal    Neurological - normal exam         Other findings: gravid            Anesthesia Plan    ASA 2       Plan - epidural   Neuraxial block will be labor analgesia              Pertinent diagnostic labs and testing reviewed    Informed Consent:    Anesthetic plan and risks discussed with patient.

## 2020-03-07 NOTE — H&P
History and Physical    Gisela Dykes is a 29 y.o. female  -Para:     Gestational Age:  39w0d  Admitted for:   Induction of Labor, elective  Admitted to  [facility].  Patient received prenatal care: [facility]    HPI: Patient is admitted with the above mentioned Chief Complaint and States   Loss of fluid:   negative  Abdominal Pain:  Negative, feeling increasing pressure in pelvis  Uterine Contractions:  negative  Vaginal Bleeding:  negative  Fetal Movement:  Normal    Patient denies fever, chills, nausea, vomiting , headache, visual disturbance, or dysuria  Patient's last menstrual period was 2019.  Estimated Date of Delivery: 3/14/20  Final DOROTHY: 3/14/2020, by Last Menstrual Period    Patient Active Problem List    Diagnosis Date Noted   • Supervision of other normal pregnancy 2019       Admitting DX: Pregnancy  Pregnancy Complications:  none  OB Risk Factors:   None  Labor State:    Not in labor.    History:  PMH: Denies. Takes PNV and zofran for nausea    PSH: Denies    FH: Denies    SH: Denies tobacco, alcohol, marijuana, other substance use    OB History    Para Term  AB Living   2 1 1     1   SAB TAB Ectopic Molar Multiple Live Births             1      # Outcome Date GA Lbr Osvaldo/2nd Weight Sex Delivery Anes PTL Lv   2 Current            1 Term 18   3.118 kg (6 lb 14 oz) M Vag-Spont EPI  OSLE       Medications:  No current facility-administered medications on file prior to encounter.      Current Outpatient Medications on File Prior to Encounter   Medication Sig Dispense Refill   • ondansetron (ZOFRAN ODT) 4 MG TABLET DISPERSIBLE Take 4 mg by mouth every 6 hours as needed for Nausea.     • Prenatal w/o A Vit-Fe Fum-FA (PRENATA PO) Take  by mouth.         Allergies:  Patient has no known allergies.    ROS:   Neuro: negative    Cardiovascular: negative  Gastro intestinal: negative  Genitourinary: negative            Physical  Exam:  /71   Pulse 90   Temp 36.5 °C (97.7 °F) (Temporal)   LMP 06/08/2019   Constitutional: healthy-appearing, Well-developed, well-nourished, in no acute distress  HEENT: PERRLA, EOMI, Sclera clear, anicteric and Oropharynx clear, no lesions  Breast Exam: Deferred  Cardio: regular rate and rhythm, S1, S2 normal, no murmur, click, rub or gallop  Lung: unlabored respirations, no intercostal retractions or accessory muscle use  Abdomen: abdomen is soft without significant tenderness, masses, organomegaly or guarding  Extremity: extremities, peripheral pulses and reflexes normal, no edema, redness or tenderness in the calves or thighs, feet normal, good pulses, normal color, temperature and sensation    Cervical Exam: 50%  Cervix Dilatation: 3  Station: negative 2  Pelvis: Normal  Fetal Assessment: Fetal heart variability: moderate  Fetal Heart Rate decelerations: none  Fetal Heart Rate accelerations: yes  Baseline FHR: 130 per minute  Estimated Fetal Weight: 3000 - 3500g      Labs:      Prenatal Results     General (Most Recent Result)     Test Value Date Time    ABO       Rh       Antibody screen       HbA1c       Gonorrhea Negative  01/14/20 1125    Chlamydia  by PCR Negative  01/14/20 1125    Chlamydia by DNA       RPR/Syphilus Non Reactive  12/30/19 1307    HSV 1/2 by PCR (non-serum)       HSV 1/2 (serum)       HSV 1       HSV 2       HPV (16)       HPV (18)       HPV (other)       HBsAg       HIV-1 HIV-2 Antibodies       Rubella       Tb             Pap Smear (Most Recent Result)     Test Value Date Time    Pap smear       Pap smear w/HPV       Pap smear w/CTNG       Pap smar w/HPV CTNG       Pap smear (reflex HPV ACUS)       Pap smear (reflex HPV ASCUS w/CTNG)       Pathology gyn specimen             Urinalysis (Most Recent Result)     Test Value Date Time    Urinalysis       Urinalysis (culture if indicated)       POC urinalysis       Urine drug screen       Urine drug screen (w/o conf)       Urine  culture ([id number])       Urine culture ([id number])             1st Trimester     Test Value Date Time    Hgb       Hct       Fasting Glucose Tolerance       GTT, 1 hour       GTT, 2 hours       GTT, 3 hours             2nd Trimester     Test Value Date Time    Hgb       Hct       Urinalysis       Urine Culture       AST       ALT       Uric Acid       Fasting Glucose Tolerance       GTT, 1 hour       GTT, 2 hours       GTT, 3 hours       Urine Protein/Creatinine Ratio             3rd Trimester     Test Value Date Time    Hgb 12.7 g/dL 12/30/19 1307    Hct 38.1 % 12/30/19 1307    Platelet count 275 K/uL 12/30/19 1307    GBS (ROQUE BROTH) Negative  02/18/20 1731    GBS (Direct) Negative  02/18/20 1731    Urinalysis       Urine Culture       Urine Drug Screen       Urine Protein/Creatinine Ratio             Congenital Disease Screening     Test Value Date Time    First Trimester Screen       Quad Screen       Sickle Cell       Thalassemia       [facility]       Cystic Fibrosis Carrier Study                     Assessment:  Gestational Age:  39w0d  Labor State:   IOL for elective  Risk Factors:   None  Pregnancy Complications: None  GBS neg    Patient Active Problem List    Diagnosis Date Noted   • Supervision of other normal pregnancy 12/03/2019       Plan:   Admitted for: Induction of Labor, elective  Anticipate vaginal delivery  CBC  Pitocin augmentation  Antibiotics: None indicated    John Castaneda M.D.

## 2020-03-07 NOTE — ANESTHESIA PROCEDURE NOTES
Epidural Block  Date/Time: 3/7/2020 2:25 PM  Performed by: Sj Christensen M.D.  Authorized by: Sj Christensen M.D.     Patient Location:  OB  Start Time:  3/7/2020 2:25 PM  End Time:  3/7/2020 2:40 PM  Reason for Block: labor analgesia    patient identified, IV checked, site marked, risks and benefits discussed, surgical consent, monitors and equipment checked, pre-op evaluation and timeout performed    Patient Position:  Sitting  Prep: ChloraPrep, patient draped and sterile technique    Monitoring:  Blood pressure, continuous pulse oximetry and heart rate  Approach:  Midline  Location:  L4-L5  Injection Technique:  REYES air and REYES saline  Skin infiltration:  Lidocaine  Strength:  1%  Dose:  3ml  Needle Type:  Tuohy  Needle Gauge:  17 G  Needle Length:  3.5 in  Loss of resistance::  6  Catheter Size:  19 G  Catheter at Skin Depth:  12  Test Dose Result:  Negative  Events: injection resistance     2 attempts made. 1st attempt at L3-L4 easy placement of catheter but resistance to injection of test dose. Pulled catheter back 2 cm but still difficult to inject. Removed catheter and informed patient. 2nd attempt at L4/L5 easy placement of catheter with no resistance to injection.

## 2020-03-08 VITALS
BODY MASS INDEX: 33.99 KG/M2 | OXYGEN SATURATION: 96 % | SYSTOLIC BLOOD PRESSURE: 117 MMHG | TEMPERATURE: 97.8 F | RESPIRATION RATE: 17 BRPM | HEART RATE: 71 BPM | HEIGHT: 61 IN | DIASTOLIC BLOOD PRESSURE: 80 MMHG | WEIGHT: 180 LBS

## 2020-03-08 LAB
ERYTHROCYTE [DISTWIDTH] IN BLOOD BY AUTOMATED COUNT: 41.2 FL (ref 35.9–50)
HCT VFR BLD AUTO: 34.8 % (ref 37–47)
HGB BLD-MCNC: 11.3 G/DL (ref 12–16)
MCH RBC QN AUTO: 27.8 PG (ref 27–33)
MCHC RBC AUTO-ENTMCNC: 32.5 G/DL (ref 33.6–35)
MCV RBC AUTO: 85.5 FL (ref 81.4–97.8)
PLATELET # BLD AUTO: 157 K/UL (ref 164–446)
PMV BLD AUTO: 12.1 FL (ref 9–12.9)
RBC # BLD AUTO: 4.07 M/UL (ref 4.2–5.4)
WBC # BLD AUTO: 10 K/UL (ref 4.8–10.8)

## 2020-03-08 PROCEDURE — 90715 TDAP VACCINE 7 YRS/> IM: CPT

## 2020-03-08 PROCEDURE — 85027 COMPLETE CBC AUTOMATED: CPT

## 2020-03-08 PROCEDURE — 700112 HCHG RX REV CODE 229: Performed by: NURSE PRACTITIONER

## 2020-03-08 PROCEDURE — A9270 NON-COVERED ITEM OR SERVICE: HCPCS | Performed by: NURSE PRACTITIONER

## 2020-03-08 PROCEDURE — 90686 IIV4 VACC NO PRSV 0.5 ML IM: CPT | Performed by: OBSTETRICS & GYNECOLOGY

## 2020-03-08 PROCEDURE — 36415 COLL VENOUS BLD VENIPUNCTURE: CPT

## 2020-03-08 PROCEDURE — 700102 HCHG RX REV CODE 250 W/ 637 OVERRIDE(OP): Performed by: NURSE PRACTITIONER

## 2020-03-08 PROCEDURE — 3E0234Z INTRODUCTION OF SERUM, TOXOID AND VACCINE INTO MUSCLE, PERCUTANEOUS APPROACH: ICD-10-PCS | Performed by: OBSTETRICS & GYNECOLOGY

## 2020-03-08 PROCEDURE — 90471 IMMUNIZATION ADMIN: CPT

## 2020-03-08 PROCEDURE — 700102 HCHG RX REV CODE 250 W/ 637 OVERRIDE(OP): Performed by: STUDENT IN AN ORGANIZED HEALTH CARE EDUCATION/TRAINING PROGRAM

## 2020-03-08 PROCEDURE — 700111 HCHG RX REV CODE 636 W/ 250 OVERRIDE (IP)

## 2020-03-08 PROCEDURE — A9270 NON-COVERED ITEM OR SERVICE: HCPCS | Performed by: STUDENT IN AN ORGANIZED HEALTH CARE EDUCATION/TRAINING PROGRAM

## 2020-03-08 PROCEDURE — 3E02340 INTRODUCTION OF INFLUENZA VACCINE INTO MUSCLE, PERCUTANEOUS APPROACH: ICD-10-PCS | Performed by: OBSTETRICS & GYNECOLOGY

## 2020-03-08 PROCEDURE — 700111 HCHG RX REV CODE 636 W/ 250 OVERRIDE (IP): Performed by: OBSTETRICS & GYNECOLOGY

## 2020-03-08 RX ORDER — DOCUSATE SODIUM 100 MG/1
100 CAPSULE, LIQUID FILLED ORAL 2 TIMES DAILY PRN
Status: DISCONTINUED | OUTPATIENT
Start: 2020-03-08 | End: 2020-03-08 | Stop reason: HOSPADM

## 2020-03-08 RX ORDER — IBUPROFEN 800 MG/1
800 TABLET ORAL EVERY 6 HOURS PRN
Qty: 90 TAB | Refills: 2 | Status: SHIPPED | OUTPATIENT
Start: 2020-03-08 | End: 2022-04-02

## 2020-03-08 RX ORDER — SODIUM CHLORIDE, SODIUM LACTATE, POTASSIUM CHLORIDE, CALCIUM CHLORIDE 600; 310; 30; 20 MG/100ML; MG/100ML; MG/100ML; MG/100ML
INJECTION, SOLUTION INTRAVENOUS PRN
Status: DISCONTINUED | OUTPATIENT
Start: 2020-03-08 | End: 2020-03-08 | Stop reason: HOSPADM

## 2020-03-08 RX ORDER — METHYLERGONOVINE MALEATE 0.2 MG/ML
0.2 INJECTION INTRAVENOUS
Status: DISCONTINUED | OUTPATIENT
Start: 2020-03-08 | End: 2020-03-08 | Stop reason: HOSPADM

## 2020-03-08 RX ORDER — OXYCODONE AND ACETAMINOPHEN 10; 325 MG/1; MG/1
1 TABLET ORAL EVERY 4 HOURS PRN
Status: CANCELLED | OUTPATIENT
Start: 2020-03-08

## 2020-03-08 RX ORDER — PSEUDOEPHEDRINE HCL 30 MG
100 TABLET ORAL 2 TIMES DAILY PRN
Qty: 60 CAP | Refills: 2 | Status: SHIPPED | OUTPATIENT
Start: 2020-03-08 | End: 2020-03-08

## 2020-03-08 RX ORDER — VITAMIN A ACETATE, BETA CAROTENE, ASCORBIC ACID, CHOLECALCIFEROL, .ALPHA.-TOCOPHEROL ACETATE, DL-, THIAMINE MONONITRATE, RIBOFLAVIN, NIACINAMIDE, PYRIDOXINE HYDROCHLORIDE, FOLIC ACID, CYANOCOBALAMIN, CALCIUM CARBONATE, FERROUS FUMARATE, ZINC OXIDE, CUPRIC OXIDE 3080; 12; 120; 400; 1; 1.84; 3; 20; 22; 920; 25; 200; 27; 10; 2 [IU]/1; UG/1; MG/1; [IU]/1; MG/1; MG/1; MG/1; MG/1; MG/1; [IU]/1; MG/1; MG/1; MG/1; MG/1; MG/1
1 TABLET, FILM COATED ORAL EVERY MORNING
Status: DISCONTINUED | OUTPATIENT
Start: 2020-03-08 | End: 2020-03-08 | Stop reason: HOSPADM

## 2020-03-08 RX ORDER — IBUPROFEN 800 MG/1
800 TABLET ORAL EVERY 6 HOURS PRN
Status: CANCELLED | OUTPATIENT
Start: 2020-03-08

## 2020-03-08 RX ORDER — PSEUDOEPHEDRINE HCL 30 MG
100 TABLET ORAL 2 TIMES DAILY PRN
Qty: 60 CAP | Refills: 2 | Status: SHIPPED | OUTPATIENT
Start: 2020-03-08 | End: 2022-04-02

## 2020-03-08 RX ORDER — BISACODYL 10 MG
10 SUPPOSITORY, RECTAL RECTAL PRN
Status: DISCONTINUED | OUTPATIENT
Start: 2020-03-08 | End: 2020-03-08 | Stop reason: HOSPADM

## 2020-03-08 RX ORDER — IBUPROFEN 800 MG/1
800 TABLET ORAL EVERY 6 HOURS PRN
Qty: 90 TAB | Refills: 2 | Status: SHIPPED | OUTPATIENT
Start: 2020-03-08 | End: 2020-03-08

## 2020-03-08 RX ADMIN — INFLUENZA A VIRUS A/BRISBANE/02/2018 IVR-190 (H1N1) ANTIGEN (FORMALDEHYDE INACTIVATED), INFLUENZA A VIRUS A/KANSAS/14/2017 X-327 (H3N2) ANTIGEN (FORMALDEHYDE INACTIVATED), INFLUENZA B VIRUS B/PHUKET/3073/2013 ANTIGEN (FORMALDEHYDE INACTIVATED), AND INFLUENZA B VIRUS B/MARYLAND/15/2016 BX-69A ANTIGEN (FORMALDEHYDE INACTIVATED) 0.5 ML: 15; 15; 15; 15 INJECTION, SUSPENSION INTRAMUSCULAR at 15:44

## 2020-03-08 RX ADMIN — IBUPROFEN 800 MG: 800 TABLET, FILM COATED ORAL at 13:44

## 2020-03-08 RX ADMIN — IBUPROFEN 800 MG: 800 TABLET, FILM COATED ORAL at 07:18

## 2020-03-08 RX ADMIN — VITAMIN A, VITAMIN C, VITAMIN D-3, VITAMIN E, VITAMIN B-1, VITAMIN B-2, NIACIN, VITAMIN B-6, CALCIUM, IRON, ZINC, COPPER 1 TABLET: 4000; 120; 400; 22; 1.84; 3; 20; 10; 1; 12; 200; 27; 25; 2 TABLET ORAL at 07:59

## 2020-03-08 RX ADMIN — TETANUS TOXOID, REDUCED DIPHTHERIA TOXOID AND ACELLULAR PERTUSSIS VACCINE, ADSORBED 0.5 ML: 5; 2.5; 8; 8; 2.5 SUSPENSION INTRAMUSCULAR at 15:45

## 2020-03-08 RX ADMIN — IBUPROFEN 800 MG: 800 TABLET, FILM COATED ORAL at 21:06

## 2020-03-08 RX ADMIN — DOCUSATE SODIUM 100 MG: 100 CAPSULE, LIQUID FILLED ORAL at 18:51

## 2020-03-08 ASSESSMENT — EDINBURGH POSTNATAL DEPRESSION SCALE (EPDS)
I HAVE BLAMED MYSELF UNNECESSARILY WHEN THINGS WENT WRONG: NOT VERY OFTEN
I HAVE BEEN ABLE TO LAUGH AND SEE THE FUNNY SIDE OF THINGS: AS MUCH AS I ALWAYS COULD
I HAVE BEEN ANXIOUS OR WORRIED FOR NO GOOD REASON: NO, NOT AT ALL
I HAVE FELT SAD OR MISERABLE: NO, NOT AT ALL
I HAVE FELT SCARED OR PANICKY FOR NO GOOD REASON: NO, NOT AT ALL
I HAVE LOOKED FORWARD WITH ENJOYMENT TO THINGS: AS MUCH AS I EVER DID
I HAVE BEEN SO UNHAPPY THAT I HAVE HAD DIFFICULTY SLEEPING: NOT AT ALL
THE THOUGHT OF HARMING MYSELF HAS OCCURRED TO ME: NEVER
THINGS HAVE BEEN GETTING ON TOP OF ME: NO, MOST OF THE TIME I HAVE COPED QUITE WELL
I HAVE BEEN SO UNHAPPY THAT I HAVE BEEN CRYING: NO, NEVER

## 2020-03-08 NOTE — PROGRESS NOTES
Pt. Arrived from floor via wheelchair, received report from ROULA Travis. Assessment complete VSS. Pt oriented to room, call light, emergency light, bulb syringe, and placing baby on back to sleep. Call light within reach. Pt. Requested pain medication as needed, will continue to assess.

## 2020-03-08 NOTE — PROGRESS NOTES
EDC- 3/14, EGA- 39    1045- Pt arrived to L&D for elective IOL.  EFM/TOCO applied, VSS.  Pt denies feeling painful UC's, LOF, or VB.  Reports +FM.  SVE- 3/50/-2.    1200- Pitocin started per MD orders.  1415- Dr. Christensen at bs fpr epidural   1435- Test dose.  Pt tolerated well.  1505- Sams catheter placed.  SVE- 4/80/-2.  1830- SVE- 8/100/0.  1842- Dr. Duque at bs.  SVE- complete.    -  of a viable female.  - Spontaneous delivery of intact placenta.  - Report to CHARANJIT Oliva RN.

## 2020-03-08 NOTE — DISCHARGE SUMMARY
Discharge Summary:      Gisela Dykes    Admit Date:   3/7/2020  Discharge Date:  3/8/2020     Admitting diagnosis:  Pregnancy  Indication for care in labor or delivery  Discharge Diagnosis: Status post vaginal, IOL elective.  Pregnancy Complications: none  Tubal Ligation:  N\A        History:  History reviewed. No pertinent past medical history.  OB History    Para Term  AB Living   2 1 1     1   SAB TAB Ectopic Molar Multiple Live Births             1      # Outcome Date GA Lbr Osvaldo/2nd Weight Sex Delivery Anes PTL Lv   2 Current            1 Term 19   3.118 kg (6 lb 14 oz) M Vag-Spont EPI  SOLE        Patient has no known allergies.  Patient Active Problem List    Diagnosis Date Noted   • Pregnant 2019        Hospital Course:   29 y.o. , now para 2, was admitted with the above mentioned diagnosis, underwent Induction of Labor, vaginal delivery. Patient postpartum course was unremarkable, with progressive advancement in diet , ambulation and toleration of oral analgesia. Patient initially wanted to stay for more postpartum support, but now without complaints and desires 24h discharge.      Vitals:    20 1830 20 2200 20 0150 20 0600   BP: 127/74 119/78 118/88 114/68   Pulse: 80 60 70 62   Resp:  18 18 18   Temp:  36.7 °C (98.1 °F) 37.2 °C (99 °F) 36.9 °C (98.5 °F)   TempSrc:  Temporal Temporal Temporal   SpO2:  95% 96% 95%   Weight:       Height:           Current Facility-Administered Medications   Medication Dose   • LR infusion     • PRN oxytocin (PITOCIN) (20 Units/1000 mL) PRN for excessive uterine bleeding - See Admin Instr  125-999 mL/hr   • methylergonovine (METHERGINE) injection 0.2 mg  0.2 mg   • docusate sodium (COLACE) capsule 100 mg  100 mg   • bisacodyl (DULCOLAX) suppository 10 mg  10 mg   • magnesium hydroxide (MILK OF MAGNESIA) suspension 30 mL  30 mL   • prenatal plus vitamin (STUARTNATAL 1+1) 27-1 MG tablet 1 Tab  1 Tab   •  influenza vaccine quad injection 0.5 mL  0.5 mL   • D5LR infusion     • oxytocin (PITOCIN) infusion (for postpartum)  2,000 mL/hr    Followed by   • oxytocin (PITOCIN) infusion (for postpartum)   mL/hr   • ibuprofen (MOTRIN) tablet 800 mg  800 mg   • acetaminophen (TYLENOL) tablet 650 mg  650 mg   • oxytocin (PITOCIN) 20 UNITS/1000ML LR (induction of labor)  0.5-20 evangelist-units/min   • ropivacaine (NAROPIN) injection     • ondansetron (ZOFRAN) syringe/vial injection 4 mg  4 mg       Exam:  See progress note from today by Elyssa Nelson     Labs:  Recent Labs     03/07/20  1115 03/08/20  0514   WBC 6.7 10.0   RBC 4.48 4.07*   HEMOGLOBIN 12.5 11.3*   HEMATOCRIT 37.5 34.8*   MCV 83.7 85.5   MCH 27.9 27.8   MCHC 33.3* 32.5*   RDW 40.8 41.2   PLATELETCT 188 157*   MPV 12.0 12.1        Activity:   Discharge to home  Pelvic Rest x 6 weeks    Assessment:  normal postpartum course  Discharge Assessment: Taking adequate diet and fluids, voiding, ambulating without difficulty, pain well controlled.     Follow up: .TPC  5 weeks for vaginal.      Discharge Meds:   Current Outpatient Medications   Medication Sig Dispense Refill   • docusate sodium 100 MG Cap Take 100 mg by mouth 2 times a day as needed for Constipation. 60 Cap 2   • ibuprofen (MOTRIN) 800 MG Tab Take 1 Tab by mouth every 6 hours as needed (For cramping after delivery; do not give if patient is receiving ketorolac (Toradol)). 90 Tab 2       John Castaneda M.D.

## 2020-03-08 NOTE — PROGRESS NOTES
185- of viable baby girl with 8/9 apgars  -rcvd report from rajwinder RN and assumed care of pt.  -pt up to bathroom to void and jalen area cleaned. Pt transferred to  via wheelchair in stable condition with baby. Report given to Melissa CELESTE

## 2020-03-08 NOTE — LACTATION NOTE
Mother states baby has been breastfeeding well, she denies pain when she breastfeeds and declines offer for assistance at this time, she states baby is feeding frequently for short feedings, encouraged to lengthen feedings if possible    Written and verbal information provided on outpatient breastfeeding assistance available at the [facility] and encouraged to call to schedule consult as needed    Encouraged to call for assistance as needed

## 2020-03-08 NOTE — ANESTHESIA POSTPROCEDURE EVALUATION
Patient: Gisela Dykes    Procedure Summary     Date:  03/07/20 Room / Location:      Anesthesia Start:  1415 Anesthesia Stop:  1851    Procedure:  Labor Epidural Diagnosis:      Scheduled Providers:   Responsible Provider:  Odette Montanez M.D.    Anesthesia Type:  epidural ASA Status:  2          Final Anesthesia Type: epidural  Last vitals  BP   Blood Pressure: 127/74    Temp   36.8 °C (98.2 °F)    Pulse   Pulse: 80   Resp        SpO2   91 %      Anesthesia Post Evaluation    Patient location during evaluation: bedside  Patient participation: complete - patient participated  Level of consciousness: awake and alert  Pain score: 0    Airway patency: patent  Anesthetic complications: no  Cardiovascular status: adequate  Respiratory status: acceptable  Hydration status: acceptable    PONV: none

## 2020-03-08 NOTE — PROGRESS NOTES
"Gisela Dykes   39w0d    Subjective: Comfortable with epidural in place. Not feeling contraction pain. Denies VB or LOF.    Objective:   Vitals:    03/07/20 1052   BP: 113/71   Pulse: 90   Temp: 36.5 °C (97.7 °F)   TempSrc: Temporal   Weight: 81.6 kg (180 lb)   Height: 1.549 m (5' 1\")     Fetal heart variability: moderate  Fetal Heart Rate decelerations: none  Fetal Heart Rate accelerations: yes  Baseline FHR: 130 per minute  Cervical: 90% ;  Dilatation .5-6 ; Station negative1  Fetal position: Cephalic  Membranes: ruptured: .yes  AROM: .yes, Meconium: .no  IUPC: .no, FSE .no    Meds:     Epidural : .yes  Pitocin: .yes 6mU/min      Lab:   Recent Results (from the past 48 hour(s))   Hold Blood Bank Specimen (Not Tested)    Collection Time: 03/07/20 11:15 AM   Result Value Ref Range    Holding Tube - Bb DONE    CBC WITH DIFFERENTIAL    Collection Time: 03/07/20 11:15 AM   Result Value Ref Range    WBC 6.7 4.8 - 10.8 K/uL    RBC 4.48 4.20 - 5.40 M/uL    Hemoglobin 12.5 12.0 - 16.0 g/dL    Hematocrit 37.5 37.0 - 47.0 %    MCV 83.7 81.4 - 97.8 fL    MCH 27.9 27.0 - 33.0 pg    MCHC 33.3 (L) 33.6 - 35.0 g/dL    RDW 40.8 35.9 - 50.0 fL    Platelet Count 188 164 - 446 K/uL    MPV 12.0 9.0 - 12.9 fL    Neutrophils-Polys 63.00 44.00 - 72.00 %    Lymphocytes 30.20 22.00 - 41.00 %    Monocytes 5.80 0.00 - 13.40 %    Eosinophils 0.30 0.00 - 6.90 %    Basophils 0.30 0.00 - 1.80 %    Immature Granulocytes 0.40 0.00 - 0.90 %    Nucleated RBC 0.00 /100 WBC    Neutrophils (Absolute) 4.24 2.00 - 7.15 K/uL    Lymphs (Absolute) 2.03 1.00 - 4.80 K/uL    Monos (Absolute) 0.39 0.00 - 0.85 K/uL    Eos (Absolute) 0.02 0.00 - 0.51 K/uL    Baso (Absolute) 0.02 0.00 - 0.12 K/uL    Immature Granulocytes (abs) 0.03 0.00 - 0.11 K/uL    NRBC (Absolute) 0.00 K/uL       Assessment:   39w0d  Labor State: Active phase labor.    P.   AROM with clear fluid  Continue Pitocin augmentation  Recheck in 2h or sooner PRN    "

## 2020-03-08 NOTE — L&D DELIVERY NOTE
Delivery Note for Vaginal Delivery     Stage 1:  29 y.o. y/o  at 39w0d weeks admitted for induction of  labor. Her pregnancy has been uncomplicated. Her labor progressed with Pitocin and AROM.  The patient was noted to be GBS negative.  Fetal monitoring during labor was overall category 2.  Patient had an epidural for pain control.     Stage II: At 1842, she was noted to be complete.  She then pushed for about 6 minutes to deliver a  viable, vigorous female infant on 3/7/20 at 1851.  The delivery was uncomplicated. Shoulders were delivered easily.  There was a nuchal that was delivered through. The infant was placed immediately upon mother’s abdomen.  Apgars at 1 and 5 minutes were 8/9 and the infant has not yet been weighed.    Stage III: Attention was then turned to active management of the third stage. The placenta was delivered spontaneously with gentle manual traction on the umbilical cord with countertraction maintained suprapubically.  On inspection, the placenta was intact and had normal insertion.  Upon delivery of the placenta, good hemostasis was noted with fundal massage and a 40 unit bolus of pitocin in IV infusion was administered per protocol.     Laceration repair: The perineum was inspected following delivery.  A small 2nd degree laceration was repaired with 2-0 vicryl after obtaining the patient's verbal consent in the usual fashion with good hemostasis achieved.     Estimated blood loss for the above procedures was 200cc.     Mother and infant in stable condition, and family pleased with birth.     Sabrina Duque DO  [facility], Women's Health

## 2020-03-08 NOTE — ANESTHESIA QCDR
2019 [facility] Clinical Data Registry (for Quality Improvement)     Postoperative nausea/vomiting risk protocol (Adult = 18 yrs and Pediatric 3-17 yrs)- (430 and 463)  General inhalation anesthetic (NOT TIVA) with PONV risk factors: No  Provision of anti-emetic therapy with at least 2 different classes of agents: N/A  Patient DID NOT receive anti-emetic therapy and reason is documented in Medical Record: N/A    Multimodal Pain Management- (477)  Non-emergent surgery AND patient age >= 18: No  Use of Multimodal Pain Management, two or more drugs and/or interventions, NOT including systemic opioids:   Exception: Documented allergy to multiple classes of analgesics:     Smoking Abstinence (404)  Patient is current smoker (cigarette, pipe, e-cig, marijuanna): No  Elective Surgery:   Abstinence instructions provided prior to day of surgery:   Patient abstained from smoking on day of surgery:     Pre-Op Beta-Blocker in Isolated CABG (44)  Isolated CABG AND patient age >= 18: No  Beta-blocker admin within 24 hours of surgical incision:   Exception:of medical reason(s) for not administering beta blocker within 24 hours prior to surgical incision (e.g., not  indicated,other medical reason):     PACU assessment of acute postoperative pain prior to Anesthesia Care End- Applies to Patients Age = 18- (ABG7)  Initial PACU pain score is which of the following: < 7/10  Patient unable to report pain score: N/A    Post-anesthetic transfer of care checklist/protocol to PACU/ICU- (426 and 427)  Upon conclusion of case, patient transferred to which of the following locations: PACU/Non-ICU  Use of transfer checklist/protocol: No  Exclusion: Service Performed in Patient Hospital Room (and thus did not require transfer): Yes   Unplanned admission to ICU related to anesthesia service up through end of PACU care- (MD51)  Unplanned admission to ICU (not initially anticipated at anesthesia start time): No

## 2020-03-08 NOTE — DISCHARGE INSTRUCTIONS
POSTPARTUM DISCHARGE INSTRUCTIONS FOR MOM    YOB: 1990   Age: 29 y.o.               Admit Date: 3/7/2020     Discharge Date: 3/8/2020  Attending Doctor:  Sabrina Duque, *                  Allergies:  Patient has no known allergies.    Discharged to home by car. Discharged via wheelchair, hospital escort: Yes.  Special equipment needed: Not Applicable  Belongings with: Personal  Be sure to schedule a follow-up appointment with your primary care doctor or any specialists as instructed.     Discharge Plan:   Influenza Vaccine Given - only chart on this line when given: Influenza Vaccine Given (See MAR)    REASONS TO CALL YOUR OBSTETRICIAN:  1.   Persistent fever or shaking chills (Temperature higher than 100.4)  2.   Heavy bleeding (soaking more than 1 pad per hour); Passing clots  3.   Foul odor from vagina  4.   Mastitis (Breast infection; breast pain, chills, fever, redness)  5.   Urinary pain, burning or frequency  6.   Episiotomy infection  7.   Abdominal incision infection  8.   Severe depression longer than 24 hours    HAND WASHING  · Prior to handling the baby.  · Before breastfeeding or bottle feeding baby.  · After using the bathroom or changing the baby's diaper.    WOUND CARE  Ask your physician for additional care instructions.  In general:    · There should not be any opening or pus.      VAGINAL CARE  · Nothing inside vagina for 6 weeks: no sexual intercourse, tampons or douching.  · Bleeding may continue for 2-4 weeks.  Amount may vary.    · Call your physician for heavy bleeding which means soaking more than 1 pad per hour    BIRTH CONTROL  · It is possible to become pregnant at any time after delivery and while breastfeeding.  · Plan to discuss a method of birth control with your physician at your follow up visit. visit.    DIET AND ELIMINATION  · Eating more fiber (bran cereal, fruits, and vegetables) and drinking plenty of fluids will help to avoid constipation.  · Urinary  "frequency after childbirth is normal.    POSTPARTUM BLUES  During the first few days after birth, you may experience a sense of the \"blues\" which may include impatience, irritability or even crying.  These feeling come and go quickly.  However, as many as 1 in 10 women experience emotional symptoms known as postpartum depression.    Postpartum depression:  May start as early as the second or third day after delivery or take several weeks or months to develop.  Symptoms of \"blues\" are present, but are more intense:  Crying spells; loss of appetite; feelings of hopelessness or loss of control; fear of touching the baby; over concern or no concern at all about the baby; little or no concern about your own appearance/caring for yourself; and/or inability to sleep or excessive sleeping.  Contact your physician if you are experiencing any of these symptoms.    Crisis Hotline:  · [place] Crisis Hotline:  3-297-YQFWRIE  Or 1-546.981.8363  · [place] Crisis Hotline:  1-292.560.6615  Or 918-575-4238    PREVENTING SHAKEN BABY:  If you are angry or stressed, PUT THE BABY IN THE CRIB, step away, take some deep breaths, and wait until you are calm to care for the baby.  DO NOT SHAKE THE BABY.  You are not alone, call a supporter for help.    · Crisis Call Center 24/7 crisis line 544-963-1686 or 1-471.382.3778  · You can also text them, text \"ANSWER\" to 256267    QUIT SMOKING/TOBACCO USE:  I understand the use of any tobacco products increases my chance of suffering from future heart disease and could cause other illnesses which may shorten my life. Quitting the use of tobacco products is the single most important thing I can do to improve my health. For further information on smoking / tobacco cessation call a Toll Free Quit Line at 1-721.785.4605 (*[employer] [place]) or 1-107.172.8552 (American Lung Association) or you can access the web based program at www.lungusa.org.    · [place] Tobacco Users Help Line:  (356) " 362-6773       Toll Free: 3-010-983-2810  · Quit Tobacco Program [facility] (191)677-3808    DEPRESSION / SUICIDE RISK:  As you are discharged from this [facility] facility, it is important to learn how to keep safe from harming yourself.    Recognize the warning signs:  · Abrupt changes in personality, positive or negative- including increase in energy   · Giving away possessions  · Change in eating patterns- significant weight changes-  positive or negative  · Change in sleeping patterns- unable to sleep or sleeping all the time   · Unwillingness or inability to communicate  · Depression  · Unusual sadness, discouragement and loneliness  · Talk of wanting to die  · Neglect of personal appearance   · Rebelliousness- reckless behavior  · Withdrawal from people/activities they love  · Confusion- inability to concentrate     If you or a loved one observes any of these behaviors or has concerns about self-harm, here's what you can do:  · Talk about it- your feelings and reasons for harming yourself  · Remove any means that you might use to hurt yourself (examples: pills, rope, extension cords, firearm)  · Get professional help from the community (Mental Health, Substance Abuse, psychological counseling)  · Do not be alone:Call your Safe Contact- someone whom you trust who will be there for you.  · Call your local CRISIS HOTLINE 445-5792 or 658-659-9700  · Call your local Children's Mobile Crisis Response Team [place] (281) 968-4226 or www.[employer]  · Call the toll free National Suicide Prevention Hotlines   · National Suicide Prevention Lifeline 030-042-XIVM (8658)  · National Hope Line Network 800-SUICIDE (641-5294)    DISCHARGE SURVEY:  Thank you for choosing [facility].  We hope we provided you with very good care.  You may be receiving a survey in the mail.  Please fill it out.  Your opinion is valuable to us.    ADDITIONAL EDUCATIONAL MATERIALS GIVEN TO PATIENT:        My  signature on this form indicates that:  1.  I have reviewed and understand the above information  2.  My questions regarding this information have been answered to my satisfaction.  3.  I have formulated a plan with my discharge nurse to obtain my prescribed medication for home.

## 2020-03-08 NOTE — CARE PLAN
Problem: Pain Management  Goal: Pain level will decrease to patient's comfort goal  Outcome: PROGRESSING AS EXPECTED  Note: Pt request pain meds as needed, will continue to assess.      Problem: Altered physiologic condition related to immediate post-delivery state and potential for bleeding/hemorrhage  Goal: Patient physiologically stable as evidenced by normal lochia, palpable uterine involution and vital signs within normal limits  Outcome: PROGRESSING AS EXPECTED  Note: Fundus firm and lochia light.

## 2020-03-08 NOTE — PROGRESS NOTES
Obstetrics & Gynecology Post-Delivery Progress Note    Date of Service  3/8/2020    29 y.o.   s/p Vaginal, Spontaneous  Admit for elective induction of labor  Delivery date: 3/7/2020  Breastfeeding: Yes    Events  No events    Subjective  Pain: Yes,  controlled  Bleeding: lochia minimal  PO's: taking regular diet  Voiding: without difficulty  Ambulating: yes  Feeding: breastfeeding well    Objective  Temp:  [36.5 °C (97.7 °F)-37.2 °C (99 °F)] 36.9 °C (98.5 °F)  Pulse:  [57-90] 62  Resp:  [18] 18  BP: (110-133)/(68-88) 114/68  SpO2:  [91 %-96 %] 95 %    Physical Exam  General: well and resting  Chest/Breasts: nipples intact and breasts soft  Fundus: firm and below umbilicus  Incision: not applicable, (vaginal delivery)  Perineum: deferred second degree laceration. No patient complaints other than general soreness  Extremities: symmetric and no edema    Lab Results   Component Value Date    RBC 4.07 (L) 2020    ABOGROUP A positive 2019       There is no immunization history on file for this patient.    Assessment/Plan  Gisela Dykes is a 29 y.o.  postpartum day  1 s/p Vaginal, Spontaneous .    1. Post care: meeting all goals  2. Hemodynamics: stable  3. Pain: controlled  4. PNL:   Lab Results   Component Value Date    RUBELLAIGG Immune 2019     5. Method of Feeding: plans to breastfeed  6. Method of Contraception: deferred at this time  7. Vaccinations:   There is no immunization history on file for this patient.  8. Disposition: likely home postpartum day 2    No new Assessment & Plan notes have been filed under this hospital service since the last note was generated.  Service: Obstetrics & Gynecology       VTE prophylaxis: patient is ambulatory    Patient desires to stay until tomorrow for postpartum support    Elyssa Nelson D.N.P.

## 2020-03-08 NOTE — CARE PLAN
Problem: Pain  Goal: Alleviation of Pain or a reduction in pain to the patient's comfort goal  Outcome: PROGRESSING AS EXPECTED  Note: Pt reports adequate pain relief with epidural in place.     Problem: Risk for Infection, Impaired Wound Healing  Goal: Remain free from signs and symptoms of infection  Outcome: PROGRESSING AS EXPECTED  Note: Pt remains free from signs/symptoms of infection.

## 2020-03-08 NOTE — ANESTHESIA TIME REPORT
Anesthesia Start and Stop Event Times     Date Time Event    3/7/2020 1410 Ready for Procedure     1415 Anesthesia Start     1851 Anesthesia Stop        Responsible Staff  03/07/20    Name Role Begin End    Sj Christensen M.D. Anesth 1415 1616    Odette Montanez M.D. Anesth 1616 1851        Preop Diagnosis (Free Text):  Pre-op Diagnosis             Preop Diagnosis (Codes):    Post op Diagnosis  Pregnancy      Premium Reason  E. Weekend    Comments:

## 2020-03-08 NOTE — PROGRESS NOTES
Report received. Assumed care. Pt in bed awake. A/O x4. VSS. Responds appropriately. C/O pain, medicated per MAR, no SOB. Fundus firm, lochia light. Assessment complete. Discussed POC,monitor VS, pain control,  care, breastfeeding, skin to skin, safety, DC planning , pt verbalizes understanding.  Call light and belongings within reach.  Bed in the lowest position. Treaded socks in place. Hourly rounding in progress. Will continue to monitor .

## 2020-03-09 NOTE — CARE PLAN
Problem: Knowledge Deficit  Goal: Knowledge of disease process/condition, treatment plan, diagnostic tests, and medications will improve  Outcome: PROGRESSING AS EXPECTED  Note: Educated on POC, all questions answered, hourly rounding in progress     Problem: Altered physiologic condition related to immediate post-delivery state and potential for bleeding/hemorrhage  Goal: Patient physiologically stable as evidenced by normal lochia, palpable uterine involution and vital signs within normal limits  Outcome: PROGRESSING AS EXPECTED  Note: VSS, fundus firm, lochia light     Problem: Potential for postpartum infection related to presence of episiotomy/vaginal tear and/or uterine contamination  Goal: Patient will be absent from signs and symptoms of infection  Outcome: PROGRESSING AS EXPECTED  Note: VSS, no s/s of infection

## 2020-03-09 NOTE — PROGRESS NOTES
Pt discharged to home with infant, discharge instructions, all belongings, and prescriptions. Medicated for pain prior to discharge. Pt verbalizes understanding of discharge instructions and follow-up care, questions answered. Stable and ambulatory at time of discharge, escorted by CNA in wheelchair to private vehicle with infant in carseat at time of discharge after ID bands checked with infant. Anastacia Julian R.N.

## 2020-04-26 ASSESSMENT — EDINBURGH POSTNATAL DEPRESSION SCALE (EPDS)
I HAVE BEEN SO UNHAPPY THAT I HAVE BEEN CRYING: NO, NEVER
I HAVE FELT SAD OR MISERABLE: NO, NOT AT ALL
I HAVE LOOKED FORWARD WITH ENJOYMENT TO THINGS: AS MUCH AS I EVER DID
I HAVE BLAMED MYSELF UNNECESSARILY WHEN THINGS WENT WRONG: NO, NEVER
THINGS HAVE BEEN GETTING ON TOP OF ME: NO, I HAVE BEEN COPING AS WELL AS EVER
I HAVE BEEN ANXIOUS OR WORRIED FOR NO GOOD REASON: NO, NOT AT ALL
I HAVE FELT SCARED OR PANICKY FOR NO GOOD REASON: NO, NOT AT ALL
THE THOUGHT OF HARMING MYSELF HAS OCCURRED TO ME: NEVER
I HAVE BEEN ABLE TO LAUGH AND SEE THE FUNNY SIDE OF THINGS: AS MUCH AS I ALWAYS COULD
I HAVE BEEN SO UNHAPPY THAT I HAVE HAD DIFFICULTY SLEEPING: NOT AT ALL
TOTAL SCORE: 0

## 2020-04-27 ENCOUNTER — POST PARTUM (OUTPATIENT)
Dept: OBGYN | Facility: CLINIC | Age: 30
End: 2020-04-27
Payer: COMMERCIAL

## 2020-04-27 VITALS — SYSTOLIC BLOOD PRESSURE: 120 MMHG | DIASTOLIC BLOOD PRESSURE: 82 MMHG | BODY MASS INDEX: 34.77 KG/M2 | WEIGHT: 184 LBS

## 2020-04-27 DIAGNOSIS — Z3A.39 39 WEEKS GESTATION OF PREGNANCY: ICD-10-CM

## 2020-04-27 PROCEDURE — 90050 PR POSTPARTUM VISIT: CPT | Performed by: OBSTETRICS & GYNECOLOGY

## 2020-04-27 RX ORDER — ACETAMINOPHEN AND CODEINE PHOSPHATE 120; 12 MG/5ML; MG/5ML
1 SOLUTION ORAL DAILY
Qty: 28 TAB | Refills: 11 | Status: SHIPPED | OUTPATIENT
Start: 2020-04-27 | End: 2022-04-02

## 2020-04-27 NOTE — PROGRESS NOTES
Gisela Dykes is a 29 y.o. female who presents for her Postpartum Exam      HPI: Pt presents for postpartum exam s/p vaginal, spontaneous on 3/7/2020. Patient is without complaints.  Baby doing well.  Breast feeding.  No depression/anxiety.  Not sexually active.  Lochia resolved.  Desires LEROY for contraception.  EDPS score: 0    Review of Systems:   Pertinent positives documented in HPI and all other systems reviewed & are negative    Physical exam:   Vital measurements:  /82   Wt 83.5 kg (184 lb)   Body mass index is 34.77 kg/m². (Goal BMI>18 <25)  Nursing note and vitals reviewed.  Gen: She is oriented to person, place, and time. She appears well-developed and well-nourished. No distress.   Heart: Heart regular rate and rhythm  Lungs: clear to auscultation bilaterally  Breasts: nontender, not engorged, no dominant masses, nipples intact  Abdomen: soft, nontender, nondistended, no rebound/guarding  Extremities: nontender, no clubbing, cyanosis or edema  Pelvic: Normal external female genitalia, well-healed perineum from delivery, cervix is normal, uterus approximately 5 weeks in size non tender, adnexa bilaterally normal with no dominant masses    A/P: Postpartum status post vaginal, spontaneous  Doing well without complaints  Continue prenatal vitamins if breast feeding  May resume normal activities including exercise, tampons, and intercourse  Discussed various methods of contraception with the patient including OCs, ring, patch, DepoProvera, IUD, Nexplanon, and tubal ligation. Also discussed barrier method and rhythm methods.   Contraception desires to go back on oral pills but wants info on LARCS.   Pt given micronor for contraceptives. Advised when she is done BF that we can place her on COCs.   Also gave her Mirena and Pargard info.   Follow up in 1yr for annual exam or sooner as needed

## 2020-04-27 NOTE — PROGRESS NOTES
Pt here today for postpartum exam.  Delivery Date 03/07/20  Currently: Breat feeding   BCM: pill  LMP: not yet  Good ph:

## 2021-04-14 ENCOUNTER — IMMUNIZATION (OUTPATIENT)
Dept: FAMILY PLANNING/WOMEN'S HEALTH CLINIC | Facility: IMMUNIZATION CENTER | Age: 31
End: 2021-04-14
Payer: COMMERCIAL

## 2021-04-14 DIAGNOSIS — Z23 ENCOUNTER FOR VACCINATION: Primary | ICD-10-CM

## 2021-04-14 PROCEDURE — 91300 PFIZER SARS-COV-2 VACCINE: CPT | Performed by: INTERNAL MEDICINE

## 2021-04-14 PROCEDURE — 0001A PFIZER SARS-COV-2 VACCINE: CPT | Performed by: INTERNAL MEDICINE

## 2021-05-13 ENCOUNTER — IMMUNIZATION (OUTPATIENT)
Dept: FAMILY PLANNING/WOMEN'S HEALTH CLINIC | Facility: IMMUNIZATION CENTER | Age: 31
End: 2021-05-13
Payer: COMMERCIAL

## 2021-05-13 DIAGNOSIS — Z23 ENCOUNTER FOR VACCINATION: Primary | ICD-10-CM

## 2021-05-13 PROCEDURE — 0002A PFIZER SARS-COV-2 VACCINE: CPT | Performed by: INTERNAL MEDICINE

## 2021-05-13 PROCEDURE — 91300 PFIZER SARS-COV-2 VACCINE: CPT | Performed by: INTERNAL MEDICINE

## 2021-09-22 ENCOUNTER — HOSPITAL ENCOUNTER (EMERGENCY)
Facility: MEDICAL CENTER | Age: 31
End: 2021-09-22
Attending: EMERGENCY MEDICINE

## 2021-09-22 ENCOUNTER — APPOINTMENT (OUTPATIENT)
Dept: RADIOLOGY | Facility: MEDICAL CENTER | Age: 31
End: 2021-09-22
Attending: EMERGENCY MEDICINE

## 2021-09-22 VITALS
SYSTOLIC BLOOD PRESSURE: 119 MMHG | OXYGEN SATURATION: 96 % | DIASTOLIC BLOOD PRESSURE: 58 MMHG | WEIGHT: 210.32 LBS | HEIGHT: 61 IN | BODY MASS INDEX: 39.71 KG/M2 | HEART RATE: 76 BPM | TEMPERATURE: 97.8 F | RESPIRATION RATE: 18 BRPM

## 2021-09-22 DIAGNOSIS — O03.9 MISCARRIAGE: ICD-10-CM

## 2021-09-22 LAB
ALBUMIN SERPL BCP-MCNC: 4.1 G/DL (ref 3.2–4.9)
ALBUMIN/GLOB SERPL: 1.4 G/DL
ALP SERPL-CCNC: 63 U/L (ref 30–99)
ALT SERPL-CCNC: 27 U/L (ref 2–50)
ANION GAP SERPL CALC-SCNC: 12 MMOL/L (ref 7–16)
APPEARANCE UR: CLEAR
AST SERPL-CCNC: 22 U/L (ref 12–45)
B-HCG SERPL-ACNC: ABNORMAL MIU/ML (ref 0–5)
BACTERIA #/AREA URNS HPF: NEGATIVE /HPF
BASOPHILS # BLD AUTO: 0.5 % (ref 0–1.8)
BASOPHILS # BLD: 0.05 K/UL (ref 0–0.12)
BILIRUB SERPL-MCNC: 0.4 MG/DL (ref 0.1–1.5)
BILIRUB UR QL STRIP.AUTO: NEGATIVE
BUN SERPL-MCNC: 7 MG/DL (ref 8–22)
CALCIUM SERPL-MCNC: 9 MG/DL (ref 8.5–10.5)
CHLORIDE SERPL-SCNC: 105 MMOL/L (ref 96–112)
CO2 SERPL-SCNC: 22 MMOL/L (ref 20–33)
COLOR UR: ABNORMAL
CREAT SERPL-MCNC: 0.73 MG/DL (ref 0.5–1.4)
EOSINOPHIL # BLD AUTO: 0.2 K/UL (ref 0–0.51)
EOSINOPHIL NFR BLD: 2.1 % (ref 0–6.9)
EPI CELLS #/AREA URNS HPF: NEGATIVE /HPF
ERYTHROCYTE [DISTWIDTH] IN BLOOD BY AUTOMATED COUNT: 38.6 FL (ref 35.9–50)
GLOBULIN SER CALC-MCNC: 2.9 G/DL (ref 1.9–3.5)
GLUCOSE SERPL-MCNC: 119 MG/DL (ref 65–99)
GLUCOSE UR STRIP.AUTO-MCNC: NEGATIVE MG/DL
HCT VFR BLD AUTO: 40.9 % (ref 37–47)
HGB BLD-MCNC: 14 G/DL (ref 12–16)
HYALINE CASTS #/AREA URNS LPF: ABNORMAL /LPF
IMM GRANULOCYTES # BLD AUTO: 0.03 K/UL (ref 0–0.11)
IMM GRANULOCYTES NFR BLD AUTO: 0.3 % (ref 0–0.9)
KETONES UR STRIP.AUTO-MCNC: NEGATIVE MG/DL
LEUKOCYTE ESTERASE UR QL STRIP.AUTO: ABNORMAL
LIPASE SERPL-CCNC: 29 U/L (ref 11–82)
LYMPHOCYTES # BLD AUTO: 3.52 K/UL (ref 1–4.8)
LYMPHOCYTES NFR BLD: 36.8 % (ref 22–41)
MCH RBC QN AUTO: 28.4 PG (ref 27–33)
MCHC RBC AUTO-ENTMCNC: 34.2 G/DL (ref 33.6–35)
MCV RBC AUTO: 83 FL (ref 81.4–97.8)
MICRO URNS: ABNORMAL
MONOCYTES # BLD AUTO: 0.61 K/UL (ref 0–0.85)
MONOCYTES NFR BLD AUTO: 6.4 % (ref 0–13.4)
NEUTROPHILS # BLD AUTO: 5.15 K/UL (ref 2–7.15)
NEUTROPHILS NFR BLD: 53.9 % (ref 44–72)
NITRITE UR QL STRIP.AUTO: NEGATIVE
NRBC # BLD AUTO: 0 K/UL
NRBC BLD-RTO: 0 /100 WBC
PH UR STRIP.AUTO: 7 [PH] (ref 5–8)
PLATELET # BLD AUTO: 306 K/UL (ref 164–446)
PMV BLD AUTO: 11.2 FL (ref 9–12.9)
POTASSIUM SERPL-SCNC: 3.5 MMOL/L (ref 3.6–5.5)
PROT SERPL-MCNC: 7 G/DL (ref 6–8.2)
PROT UR QL STRIP: 100 MG/DL
RBC # BLD AUTO: 4.93 M/UL (ref 4.2–5.4)
RBC # URNS HPF: >150 /HPF
RBC UR QL AUTO: ABNORMAL
SODIUM SERPL-SCNC: 139 MMOL/L (ref 135–145)
SP GR UR STRIP.AUTO: 1
UROBILINOGEN UR STRIP.AUTO-MCNC: 0.2 MG/DL
WBC # BLD AUTO: 9.6 K/UL (ref 4.8–10.8)
WBC #/AREA URNS HPF: ABNORMAL /HPF

## 2021-09-22 PROCEDURE — 85025 COMPLETE CBC W/AUTO DIFF WBC: CPT

## 2021-09-22 PROCEDURE — 80053 COMPREHEN METABOLIC PANEL: CPT

## 2021-09-22 PROCEDURE — 76801 OB US < 14 WKS SINGLE FETUS: CPT

## 2021-09-22 PROCEDURE — 84702 CHORIONIC GONADOTROPIN TEST: CPT

## 2021-09-22 PROCEDURE — 83690 ASSAY OF LIPASE: CPT

## 2021-09-22 PROCEDURE — 99284 EMERGENCY DEPT VISIT MOD MDM: CPT

## 2021-09-22 PROCEDURE — 76817 TRANSVAGINAL US OBSTETRIC: CPT

## 2021-09-22 PROCEDURE — 81001 URINALYSIS AUTO W/SCOPE: CPT

## 2021-09-22 NOTE — ED NOTES
Pt verbalizes understanding of discharge instructions and follow up. Pt able to ambulate out to ED lobby with all belongings.

## 2021-09-22 NOTE — DISCHARGE INSTRUCTIONS
Return to the emergency department you have severe pain, vaginal bleeding, nausea, vomiting, shortness of breath.

## 2021-09-22 NOTE — ED NOTES
This RN rounded on pt, Pt states no needs at this time, Pt informed to alert triage staff if there is any change in condition or if any needs arise. Pt resting in triage acting calm and cooperative.

## 2021-09-22 NOTE — ED PROVIDER NOTES
ED Provider Note    CHIEF COMPLAINT  Chief Complaint   Patient presents with   • Vaginal Bleeding     bleeding since last Tuesday, now having big clots filling 4 pads throughout the day.    • Abdominal Pain     Cramping pain   • Miscarriage     7.5wks pregnant       HPI  Gisela Dykes is a 31 y.o. female who presents to the emergency department plaint of vaginal bleeding.  She states that she started vaginally proxy 1 week ago and she went to the hospital in [facility].  She was diagnosed with a atrium pregnancy with 7-week 1/7 days.  She is a  female and she is Rh+.  Since that time the bleeding slowly went away but then started again 2 days ago.  She is passing large clots, going through 2-3 pads in a day and thinks she might of seen tissue passed through.  She is also endorsing slight uterine contraction and nausea.  She denies fever, shakes, chills, sweats.  She also endorse the fact that the ultrasound did reveal evidence of a right ovarian cyst and they were unsure what that ectopic pregnancy in addition to her intrauterine pregnancy and needed follow-up.  Her last menstrual cycle was on 2021.  REVIEW OF SYSTEMS  Positives as above. Pertinent negatives include fever, shakes, chills, sweats, nausea, vomiting all other 10 review of systems are negative    PAST MEDICAL HISTORY  History reviewed. No pertinent past medical history.    FAMILY HISTORY  Noncontributory    SOCIAL HISTORY  Social History     Socioeconomic History   • Marital status:      Spouse name: Not on file   • Number of children: Not on file   • Years of education: Not on file   • Highest education level: Not on file   Occupational History   • Not on file   Tobacco Use   • Smoking status: Never Smoker   • Smokeless tobacco: Never Used   Substance and Sexual Activity   • Alcohol use: Not Currently   • Drug use: Never   • Sexual activity: Yes     Partners: Male   Other Topics Concern   • Not on file   Social  "History Narrative   • Not on file     Social Determinants of Health     Financial Resource Strain:    • Difficulty of Paying Living Expenses:    Food Insecurity:    • Worried About Running Out of Food in the Last Year:    • Ran Out of Food in the Last Year:    Transportation Needs:    • Lack of Transportation (Medical):    • Lack of Transportation (Non-Medical):    Physical Activity:    • Days of Exercise per Week:    • Minutes of Exercise per Session:    Stress:    • Feeling of Stress :    Social Connections:    • Frequency of Communication with Friends and Family:    • Frequency of Social Gatherings with Friends and Family:    • Attends Judaism Services:    • Active Member of Clubs or Organizations:    • Attends Club or Organization Meetings:    • Marital Status:    Intimate Partner Violence:    • Fear of Current or Ex-Partner:    • Emotionally Abused:    • Physically Abused:    • Sexually Abused:        SURGICAL HISTORY  History reviewed. No pertinent surgical history.    CURRENT MEDICATIONS  Home Medications     Reviewed by Jody Rodriguez R.N. (Registered Nurse) on 09/22/21 at 0014  Med List Status: <None>   Medication Last Dose Status   docusate sodium 100 MG Cap  Active   ibuprofen (MOTRIN) 800 MG Tab  Active   norethindrone (MICRONOR) 0.35 MG tablet  Active   ondansetron (ZOFRAN ODT) 4 MG TABLET DISPERSIBLE  Active   Prenatal MV-Min-Fe Fum-FA-DHA (PRENATAL 1 PO)  Active                ALLERGIES  No Known Allergies    PHYSICAL EXAM  VITAL SIGNS: /58   Pulse 76   Temp 36.6 °C (97.8 °F) (Temporal)   Resp 18   Ht 1.549 m (5' 1\")   Wt 95.4 kg (210 lb 5.1 oz)   SpO2 96%   BMI 39.74 kg/m²      Constitutional: Well developed, Well nourished, No acute distress, Non-toxic appearance.   Eyes: PERRLA, EOMI, Conjunctiva normal, No discharge.   Cardiovascular: Normal heart rate, Normal rhythm, No murmurs, No rubs, No gallops, and intact distal pulses.   Thorax & Lungs:  No respiratory distress, no " rales, no rhonchi, No wheezing, No chest wall tenderness.   Abdomen: Bowel sounds normal, Soft, No tenderness, No guarding, No rebound, No pulsatile masses.   Pelvic: In the the presence of a female nurse, external examination of slight active dark bleeding, rectal exam there is slight product of conception within the open cervical os that was teased out with forceps, following this bleeding completely stopped.  No evidence of proximal conception within the cervical os over the vaginal vault  Skin: Warm, Dry, No erythema, No rash.   Extremities: Full range of motion, no deformity, no edema.  Neurologic: Alert & oriented x 3, No focal deficits noted, acting appropriately on exam.  Psychiatric: Affect normal for clinical presentation.      LABORATORY/ECG  Results for orders placed or performed during the hospital encounter of 09/22/21   CBC WITH DIFFERENTIAL   Result Value Ref Range    WBC 9.6 4.8 - 10.8 K/uL    RBC 4.93 4.20 - 5.40 M/uL    Hemoglobin 14.0 12.0 - 16.0 g/dL    Hematocrit 40.9 37.0 - 47.0 %    MCV 83.0 81.4 - 97.8 fL    MCH 28.4 27.0 - 33.0 pg    MCHC 34.2 33.6 - 35.0 g/dL    RDW 38.6 35.9 - 50.0 fL    Platelet Count 306 164 - 446 K/uL    MPV 11.2 9.0 - 12.9 fL    Neutrophils-Polys 53.90 44.00 - 72.00 %    Lymphocytes 36.80 22.00 - 41.00 %    Monocytes 6.40 0.00 - 13.40 %    Eosinophils 2.10 0.00 - 6.90 %    Basophils 0.50 0.00 - 1.80 %    Immature Granulocytes 0.30 0.00 - 0.90 %    Nucleated RBC 0.00 /100 WBC    Neutrophils (Absolute) 5.15 2.00 - 7.15 K/uL    Lymphs (Absolute) 3.52 1.00 - 4.80 K/uL    Monos (Absolute) 0.61 0.00 - 0.85 K/uL    Eos (Absolute) 0.20 0.00 - 0.51 K/uL    Baso (Absolute) 0.05 0.00 - 0.12 K/uL    Immature Granulocytes (abs) 0.03 0.00 - 0.11 K/uL    NRBC (Absolute) 0.00 K/uL   COMP METABOLIC PANEL   Result Value Ref Range    Sodium 139 135 - 145 mmol/L    Potassium 3.5 (L) 3.6 - 5.5 mmol/L    Chloride 105 96 - 112 mmol/L    Co2 22 20 - 33 mmol/L    Anion Gap 12.0 7.0 - 16.0     Glucose 119 (H) 65 - 99 mg/dL    Bun 7 (L) 8 - 22 mg/dL    Creatinine 0.73 0.50 - 1.40 mg/dL    Calcium 9.0 8.5 - 10.5 mg/dL    AST(SGOT) 22 12 - 45 U/L    ALT(SGPT) 27 2 - 50 U/L    Alkaline Phosphatase 63 30 - 99 U/L    Total Bilirubin 0.4 0.1 - 1.5 mg/dL    Albumin 4.1 3.2 - 4.9 g/dL    Total Protein 7.0 6.0 - 8.2 g/dL    Globulin 2.9 1.9 - 3.5 g/dL    A-G Ratio 1.4 g/dL   LIPASE   Result Value Ref Range    Lipase 29 11 - 82 U/L   HCG QUANTITATIVE   Result Value Ref Range    Bhcg 63303.0 (H) 0.0 - 5.0 mIU/mL   URINALYSIS,CULTURE IF INDICATED    Specimen: Urine, Clean Catch   Result Value Ref Range    Color Red (A)     Character Clear     Specific Gravity 1.005 <1.035    Ph 7.0 5.0 - 8.0    Glucose Negative Negative mg/dL    Ketones Negative Negative mg/dL    Protein 100 (A) Negative mg/dL    Bilirubin Negative Negative    Urobilinogen, Urine 0.2 Negative    Nitrite Negative Negative    Leukocyte Esterase Small (A) Negative    Occult Blood Large (A) Negative    Micro Urine Req Microscopic    URINE MICROSCOPIC (W/UA)   Result Value Ref Range    WBC 2-5 /hpf    RBC >150 (A) /hpf    Bacteria Negative None /hpf    Epithelial Cells Negative /hpf    Hyaline Cast 0-2 /lpf   ESTIMATED GFR   Result Value Ref Range    GFR If African American >60 >60 mL/min/1.73 m 2    GFR If Non African American >60 >60 mL/min/1.73 m 2       RADIOLOGY/PROCEDURES  US-OB 1ST TRIMESTER WITH TRANSVAGINAL (COMBO)   Final Result      1.  No evidence of intrauterine pregnancy.      2.  Thickening of endometrial stripe.      3.  1.3 cm right ovarian cyst.            COURSE & MEDICAL DECISION MAKING  Pertinent Labs & Imaging studies reviewed. (See chart for details)  This is a [facility] 4-year-old female presents with probable spontaneous .  The patient is explicitly 8 weeks and 1 day but on my evaluation with ultrasound she has a empty uterus.  She did have a late anterior uterine pregnancy proxy 1 week prior.  The patient's Rh is positive  patient received RhoGam.  On female examination/pelvic exam, patient has slight proximal of conception was removed and following this she had no active bleeding.  The patient is chemically stable, she has no significant anemia, she has no significant abdominal pain.  I discussed the need to follow-up with gynecology, pelvic rest and not to try to get pregnant for least 6 months.  She understands the need for follow-up as well.  Strict return precautions have been given for increasing vaginal bleeding, lightness, dizziness or severe pain.  Please note, the patient does have evidence of an ovarian cyst but is very unlikely the patient is having an ectopic pregnancy and does not appear to be an ectopic pregnancy on anatomical morphological presentation per the radiologist.      FINAL IMPRESSION     1. Miscarriage        DISPOSITION:  Patient will be discharged home in stable condition.    FOLLOW UP:  [facility], Emergency Dept  [facility] [facility] [address] [phone]    If symptoms worsen    Esther Naidu M.D.  [address] [place] [id number] [phone]    Schedule an appointment as soon as possible for a visit         OUTPATIENT MEDICATIONS:  Discharge Medication List as of 9/22/2021  9:11 AM          Electronically signed by: Jesse Guadarrama D.O., 9/22/2021 6:57 AM

## 2021-09-22 NOTE — ED TRIAGE NOTES
"Chief Complaint   Patient presents with   • Vaginal Bleeding     bleeding since last Tuesday, now having big clots filling 4 pads throughout the day.    • Abdominal Pain     Cramping pain   • Miscarriage     7.5wks pregnant     Denies CP, SOB, dizziness, N/V, and urinary issues.       /99   Pulse 82   Temp 36.4 °C (97.6 °F) (Temporal)   Resp 18   Ht 1.549 m (5' 1\")   Wt 95.4 kg (210 lb 5.1 oz)   SpO2 98%   BMI 39.74 kg/m²     "

## 2021-09-22 NOTE — ED NOTES
Triage: Pt updated on wait. Reports continuing to bleed. Denies dizziness. Offered pads. Will continue to monitor

## 2022-04-02 ENCOUNTER — HOSPITAL ENCOUNTER (OUTPATIENT)
Facility: MEDICAL CENTER | Age: 32
End: 2022-04-02
Attending: FAMILY MEDICINE
Payer: COMMERCIAL

## 2022-04-02 ENCOUNTER — OFFICE VISIT (OUTPATIENT)
Dept: URGENT CARE | Facility: PHYSICIAN GROUP | Age: 32
End: 2022-04-02
Payer: COMMERCIAL

## 2022-04-02 VITALS
HEART RATE: 68 BPM | DIASTOLIC BLOOD PRESSURE: 84 MMHG | WEIGHT: 203 LBS | BODY MASS INDEX: 38.33 KG/M2 | TEMPERATURE: 97.9 F | OXYGEN SATURATION: 97 % | SYSTOLIC BLOOD PRESSURE: 130 MMHG | RESPIRATION RATE: 16 BRPM | HEIGHT: 61 IN

## 2022-04-02 DIAGNOSIS — O21.9 NAUSEA/VOMITING IN PREGNANCY: ICD-10-CM

## 2022-04-02 DIAGNOSIS — H53.9 VISION CHANGES: ICD-10-CM

## 2022-04-02 DIAGNOSIS — Z3A.01 7 WEEKS GESTATION OF PREGNANCY: ICD-10-CM

## 2022-04-02 DIAGNOSIS — R51.9 NONINTRACTABLE HEADACHE, UNSPECIFIED CHRONICITY PATTERN, UNSPECIFIED HEADACHE TYPE: ICD-10-CM

## 2022-04-02 PROBLEM — N83.209 OVARIAN CYST: Status: ACTIVE | Noted: 2021-09-16

## 2022-04-02 LAB
APPEARANCE UR: NORMAL
BILIRUB UR STRIP-MCNC: NEGATIVE MG/DL
COLOR UR AUTO: YELLOW
GLUCOSE UR STRIP.AUTO-MCNC: NEGATIVE MG/DL
INT CON NEG: NORMAL
INT CON POS: NORMAL
KETONES UR STRIP.AUTO-MCNC: NEGATIVE MG/DL
LEUKOCYTE ESTERASE UR QL STRIP.AUTO: NORMAL
NITRITE UR QL STRIP.AUTO: NEGATIVE
PH UR STRIP.AUTO: 7.5 [PH] (ref 5–8)
POC URINE PREGNANCY TEST: POSITIVE
PROT UR QL STRIP: NEGATIVE MG/DL
RBC UR QL AUTO: NEGATIVE
SP GR UR STRIP.AUTO: 1.01
UROBILINOGEN UR STRIP-MCNC: 0.2 MG/DL

## 2022-04-02 PROCEDURE — 87086 URINE CULTURE/COLONY COUNT: CPT

## 2022-04-02 PROCEDURE — 81025 URINE PREGNANCY TEST: CPT | Performed by: FAMILY MEDICINE

## 2022-04-02 PROCEDURE — 99203 OFFICE O/P NEW LOW 30 MIN: CPT | Performed by: FAMILY MEDICINE

## 2022-04-02 PROCEDURE — 81002 URINALYSIS NONAUTO W/O SCOPE: CPT | Performed by: FAMILY MEDICINE

## 2022-04-02 RX ORDER — METOCLOPRAMIDE 10 MG/1
10 TABLET ORAL 3 TIMES DAILY PRN
Qty: 12 TABLET | Refills: 0 | Status: SHIPPED | OUTPATIENT
Start: 2022-04-02 | End: 2022-04-18

## 2022-04-02 ASSESSMENT — ENCOUNTER SYMPTOMS: HEADACHES: 1

## 2022-04-02 ASSESSMENT — FIBROSIS 4 INDEX: FIB4 SCORE: 0.42

## 2022-04-02 NOTE — PROGRESS NOTES
"Subjective     Kadi Edel Dykes is a 31 y.o. female who presents with Headache (X3days tunnel vision, head pressure l4gbnpi pregnant )      - This is a pleasant and nontoxic appearing 31 y.o. female who has come to the walk-in clinic today for:    #1) ~ 2 days ago developed spotty/wavy blurry type vision, lasted about 30 min then resolved and was followed by Rt sided headache head pressure sensation. This eventually resolved later that day but similar symptoms returned yesterday. Today vision better but is feeling pressure like headache on mainly Rt side forehead and temporal area.     No recent head trauma or focal limb weakness/numbness/heaviness    Says is ~ 7 wks pregnant, has hx morning sickness and has been having this for about 3-5 wks, nausea all day, sometimes may dry heave or throw up once a day. In prior pregnancies has used Zofran, or Reglan or Benadryl w/o issues. Has OB appointment in 2 weeks.       ALLERGIES:  Patient has no known allergies.     PMH:  History reviewed. No pertinent past medical history.     PSH:  History reviewed. No pertinent surgical history.    MEDS:    Current Outpatient Medications:   •  metoclopramide (REGLAN) 10 MG Tab, Take 1 Tablet by mouth 3 times a day as needed., Disp: 12 Tablet, Rfl: 0  •  Prenatal MV-Min-Fe Fum-FA-DHA (PRENATAL 1 PO), Take  by mouth., Disp: , Rfl:     ** I have documented what I find to be significant in regards to past medical, social, family and surgical history  in my HPI or under PMH/PSH/FH review section, otherwise it is noncontributory **             HPI    Review of Systems   Neurological: Positive for headaches.   All other systems reviewed and are negative.             Objective     /84 (BP Location: Left arm, Patient Position: Sitting, BP Cuff Size: Adult)   Pulse 68   Temp 36.6 °C (97.9 °F) (Temporal)   Resp 16   Ht 1.549 m (5' 1\")   Wt 92.1 kg (203 lb)   SpO2 97%   BMI 38.36 kg/m²        Rt 20/20    Lt 20/20,  B " 20/15    Physical Exam  Vitals and nursing note reviewed.   Constitutional:       General: She is not in acute distress.     Appearance: Normal appearance. She is well-developed.   HENT:      Head: Normocephalic.      Mouth/Throat:      Mouth: Mucous membranes are moist.      Pharynx: Oropharynx is clear.   Eyes:      Extraocular Movements: Extraocular movements intact.      Pupils: Pupils are equal, round, and reactive to light.   Cardiovascular:      Heart sounds: Normal heart sounds. No murmur heard.  Pulmonary:      Effort: Pulmonary effort is normal. No respiratory distress.      Breath sounds: Normal breath sounds.   Musculoskeletal:      Cervical back: Normal range of motion and neck supple. No rigidity.   Neurological:      General: No focal deficit present.      Mental Status: She is alert.      Cranial Nerves: No cranial nerve deficit.      Motor: No weakness or abnormal muscle tone.      Gait: Gait normal.   Psychiatric:         Mood and Affect: Mood normal.         Behavior: Behavior normal.         Assessment & Plan       1. Headache  URINE CULTURE(NEW)   2. Vision changes  Referral to Optometry   3. Nausea/vomiting in pregnancy  URINE CULTURE(NEW)    metoclopramide (REGLAN) 10 MG Tab    POCT Urinalysis   4. 7 weeks gestation of pregnancy       * may be migraine related. Asked to f/u Optometry in 2 days. ER if not improving in 1-2 days or getting worse or new symptoms     - Dx, plan & d/c instructions discussed   - Rest, stay hydrated, OTC Tylenol as needed  - E.R. precautions discussed     Asked to kindly follow up with their PCP's office for a recheck, ER if not improving or feeling/getting worse. If you do not have a primary care doctor and need to schedule one you may call [facility] at [phone] to do this.    Any realistic side effects of medications that may have been given today reviewed.     Patient left in stable condition     POCT results reviewed/discussed

## 2022-04-05 LAB
BACTERIA UR CULT: NORMAL
SIGNIFICANT IND 70042: NORMAL
SITE SITE: NORMAL
SOURCE SOURCE: NORMAL

## 2022-04-06 ENCOUNTER — APPOINTMENT (OUTPATIENT)
Dept: RADIOLOGY | Facility: MEDICAL CENTER | Age: 32
End: 2022-04-06
Attending: EMERGENCY MEDICINE
Payer: COMMERCIAL

## 2022-04-06 ENCOUNTER — HOSPITAL ENCOUNTER (EMERGENCY)
Facility: MEDICAL CENTER | Age: 32
End: 2022-04-06
Attending: EMERGENCY MEDICINE
Payer: COMMERCIAL

## 2022-04-06 VITALS
SYSTOLIC BLOOD PRESSURE: 138 MMHG | BODY MASS INDEX: 37.84 KG/M2 | DIASTOLIC BLOOD PRESSURE: 78 MMHG | WEIGHT: 200.4 LBS | OXYGEN SATURATION: 98 % | TEMPERATURE: 98.5 F | RESPIRATION RATE: 18 BRPM | HEART RATE: 80 BPM | HEIGHT: 61 IN

## 2022-04-06 DIAGNOSIS — R11.2 NON-INTRACTABLE VOMITING WITH NAUSEA, UNSPECIFIED VOMITING TYPE: Primary | ICD-10-CM

## 2022-04-06 DIAGNOSIS — O46.8X1 SUBCHORIONIC HEMORRHAGE OF PLACENTA IN FIRST TRIMESTER, SINGLE OR UNSPECIFIED FETUS: ICD-10-CM

## 2022-04-06 DIAGNOSIS — O41.8X10 SUBCHORIONIC HEMORRHAGE OF PLACENTA IN FIRST TRIMESTER, SINGLE OR UNSPECIFIED FETUS: ICD-10-CM

## 2022-04-06 LAB
ALBUMIN SERPL BCP-MCNC: 4.5 G/DL (ref 3.2–4.9)
ALBUMIN/GLOB SERPL: 1.4 G/DL
ALP SERPL-CCNC: 62 U/L (ref 30–99)
ALT SERPL-CCNC: 20 U/L (ref 2–50)
ANION GAP SERPL CALC-SCNC: 14 MMOL/L (ref 7–16)
APPEARANCE UR: ABNORMAL
AST SERPL-CCNC: 18 U/L (ref 12–45)
B-HCG SERPL-ACNC: ABNORMAL MIU/ML (ref 0–5)
BACTERIA #/AREA URNS HPF: ABNORMAL /HPF
BASOPHILS # BLD AUTO: 0.3 % (ref 0–1.8)
BASOPHILS # BLD: 0.03 K/UL (ref 0–0.12)
BILIRUB SERPL-MCNC: 0.6 MG/DL (ref 0.1–1.5)
BILIRUB UR QL STRIP.AUTO: NEGATIVE
BUN SERPL-MCNC: 7 MG/DL (ref 8–22)
CALCIUM SERPL-MCNC: 9.7 MG/DL (ref 8.5–10.5)
CHLORIDE SERPL-SCNC: 102 MMOL/L (ref 96–112)
CO2 SERPL-SCNC: 20 MMOL/L (ref 20–33)
COLOR UR: YELLOW
CREAT SERPL-MCNC: 0.62 MG/DL (ref 0.5–1.4)
EOSINOPHIL # BLD AUTO: 0.04 K/UL (ref 0–0.51)
EOSINOPHIL NFR BLD: 0.4 % (ref 0–6.9)
EPI CELLS #/AREA URNS HPF: ABNORMAL /HPF
ERYTHROCYTE [DISTWIDTH] IN BLOOD BY AUTOMATED COUNT: 38.8 FL (ref 35.9–50)
GFR SERPLBLD CREATININE-BSD FMLA CKD-EPI: 122 ML/MIN/1.73 M 2
GLOBULIN SER CALC-MCNC: 3.3 G/DL (ref 1.9–3.5)
GLUCOSE SERPL-MCNC: 91 MG/DL (ref 65–99)
GLUCOSE UR STRIP.AUTO-MCNC: NEGATIVE MG/DL
HCT VFR BLD AUTO: 44.8 % (ref 37–47)
HGB BLD-MCNC: 15.7 G/DL (ref 12–16)
HYALINE CASTS #/AREA URNS LPF: ABNORMAL /LPF
IMM GRANULOCYTES # BLD AUTO: 0.02 K/UL (ref 0–0.11)
IMM GRANULOCYTES NFR BLD AUTO: 0.2 % (ref 0–0.9)
KETONES UR STRIP.AUTO-MCNC: >=160 MG/DL
LEUKOCYTE ESTERASE UR QL STRIP.AUTO: ABNORMAL
LIPASE SERPL-CCNC: 21 U/L (ref 11–82)
LYMPHOCYTES # BLD AUTO: 2.33 K/UL (ref 1–4.8)
LYMPHOCYTES NFR BLD: 25.2 % (ref 22–41)
MCH RBC QN AUTO: 29.1 PG (ref 27–33)
MCHC RBC AUTO-ENTMCNC: 35 G/DL (ref 33.6–35)
MCV RBC AUTO: 83 FL (ref 81.4–97.8)
MICRO URNS: ABNORMAL
MONOCYTES # BLD AUTO: 0.51 K/UL (ref 0–0.85)
MONOCYTES NFR BLD AUTO: 5.5 % (ref 0–13.4)
NEUTROPHILS # BLD AUTO: 6.3 K/UL (ref 2–7.15)
NEUTROPHILS NFR BLD: 68.4 % (ref 44–72)
NITRITE UR QL STRIP.AUTO: NEGATIVE
NRBC # BLD AUTO: 0 K/UL
NRBC BLD-RTO: 0 /100 WBC
NUMBER OF RH DOSES IND 8505RD: NORMAL
PH UR STRIP.AUTO: 5 [PH] (ref 5–8)
PLATELET # BLD AUTO: 286 K/UL (ref 164–446)
PMV BLD AUTO: 10.5 FL (ref 9–12.9)
POTASSIUM SERPL-SCNC: 4.2 MMOL/L (ref 3.6–5.5)
PROT SERPL-MCNC: 7.8 G/DL (ref 6–8.2)
PROT UR QL STRIP: NEGATIVE MG/DL
RBC # BLD AUTO: 5.4 M/UL (ref 4.2–5.4)
RBC # URNS HPF: ABNORMAL /HPF
RBC UR QL AUTO: NEGATIVE
RH BLD: NORMAL
SODIUM SERPL-SCNC: 136 MMOL/L (ref 135–145)
SP GR UR STRIP.AUTO: 1.02
UROBILINOGEN UR STRIP.AUTO-MCNC: 1 MG/DL
WBC # BLD AUTO: 9.2 K/UL (ref 4.8–10.8)
WBC #/AREA URNS HPF: ABNORMAL /HPF

## 2022-04-06 PROCEDURE — 76801 OB US < 14 WKS SINGLE FETUS: CPT

## 2022-04-06 PROCEDURE — 81001 URINALYSIS AUTO W/SCOPE: CPT

## 2022-04-06 PROCEDURE — 700105 HCHG RX REV CODE 258: Performed by: EMERGENCY MEDICINE

## 2022-04-06 PROCEDURE — 36415 COLL VENOUS BLD VENIPUNCTURE: CPT

## 2022-04-06 PROCEDURE — 86901 BLOOD TYPING SEROLOGIC RH(D): CPT

## 2022-04-06 PROCEDURE — 96376 TX/PRO/DX INJ SAME DRUG ADON: CPT

## 2022-04-06 PROCEDURE — 99285 EMERGENCY DEPT VISIT HI MDM: CPT

## 2022-04-06 PROCEDURE — 85025 COMPLETE CBC W/AUTO DIFF WBC: CPT

## 2022-04-06 PROCEDURE — 700111 HCHG RX REV CODE 636 W/ 250 OVERRIDE (IP): Performed by: EMERGENCY MEDICINE

## 2022-04-06 PROCEDURE — 96375 TX/PRO/DX INJ NEW DRUG ADDON: CPT

## 2022-04-06 PROCEDURE — 83690 ASSAY OF LIPASE: CPT

## 2022-04-06 PROCEDURE — 96374 THER/PROPH/DIAG INJ IV PUSH: CPT

## 2022-04-06 PROCEDURE — 87086 URINE CULTURE/COLONY COUNT: CPT

## 2022-04-06 PROCEDURE — 84702 CHORIONIC GONADOTROPIN TEST: CPT

## 2022-04-06 PROCEDURE — 80053 COMPREHEN METABOLIC PANEL: CPT

## 2022-04-06 RX ORDER — ONDANSETRON 4 MG/1
4 TABLET, ORALLY DISINTEGRATING ORAL EVERY 6 HOURS PRN
Qty: 16 TABLET | Refills: 0 | Status: SHIPPED | OUTPATIENT
Start: 2022-04-06 | End: 2022-04-10

## 2022-04-06 RX ORDER — DIPHENHYDRAMINE HYDROCHLORIDE 50 MG/ML
25 INJECTION INTRAMUSCULAR; INTRAVENOUS ONCE
Status: COMPLETED | OUTPATIENT
Start: 2022-04-06 | End: 2022-04-06

## 2022-04-06 RX ORDER — ONDANSETRON 2 MG/ML
4 INJECTION INTRAMUSCULAR; INTRAVENOUS EVERY 4 HOURS PRN
Status: DISCONTINUED | OUTPATIENT
Start: 2022-04-06 | End: 2022-04-06 | Stop reason: HOSPADM

## 2022-04-06 RX ORDER — SODIUM CHLORIDE 9 MG/ML
1000 INJECTION, SOLUTION INTRAVENOUS ONCE
Status: COMPLETED | OUTPATIENT
Start: 2022-04-06 | End: 2022-04-06

## 2022-04-06 RX ADMIN — DIPHENHYDRAMINE HYDROCHLORIDE 25 MG: 50 INJECTION INTRAMUSCULAR; INTRAVENOUS at 16:21

## 2022-04-06 RX ADMIN — ONDANSETRON 4 MG: 2 INJECTION INTRAMUSCULAR; INTRAVENOUS at 16:21

## 2022-04-06 RX ADMIN — ONDANSETRON 4 MG: 2 INJECTION INTRAMUSCULAR; INTRAVENOUS at 20:30

## 2022-04-06 RX ADMIN — SODIUM CHLORIDE 1000 ML: 9 INJECTION, SOLUTION INTRAVENOUS at 16:21

## 2022-04-06 ASSESSMENT — ENCOUNTER SYMPTOMS
ABDOMINAL PAIN: 1
NAUSEA: 1
CHILLS: 0
RESPIRATORY NEGATIVE: 1
FEVER: 0
VOMITING: 1

## 2022-04-06 ASSESSMENT — FIBROSIS 4 INDEX: FIB4 SCORE: 0.42

## 2022-04-06 NOTE — ED PROVIDER NOTES
"ED Provider Note     2022  3:45 PM    Means of Arrival: Walk In  History obtained by: patient  Limitations: None  OBGYN: Dr. Bell    CHIEF COMPLAINT  Chief Complaint   Patient presents with   • N/V     X2 days   • Pregnancy     7 weeks gestation   • Abdominal Pain     Generalized x2 days       HPI  Gisela Dykes is a 31 y.o. female  presents approximately 7 weeks pregnant. She is here today because she cannot stop vomiting. She is unable to keep any water down. Today she felt extremely fatigue and states that she wasn't able to life her 4 year old son. She has had some mild abdominal.  She has been taking her prenatal vitamins, vitamin B6, and Unisom with no alleviation of her symptoms. With her previous pregnancy she has used Zofran to control her nausea.  She denies vaginal bleeding, fever, chills, hematuria, or dysuria. No alleviating or exacerbating factors were reported. On Saturday, she was getting tunnel vision with extreme migraines and was seen at Urgent Care. She was prescribe Reglan and states she hasn't taken it because she heard that it can have some \"bad\" side affects. She is seen by [facility]. Her last menstrual period was . She has taken multiple at home pregnancy test but has not had an ultrasound yet.     REVIEW OF SYSTEMS  Review of Systems   Constitutional: Negative for chills and fever.   Respiratory: Negative.    Gastrointestinal: Positive for abdominal pain (described as mild cramping), nausea and vomiting.   Genitourinary: Negative.    All other systems reviewed and are negative.    See HPI for further details.    PAST MEDICAL HISTORY   has a past medical history of Patient denies medical problems.    SOCIAL HISTORY  Social History     Tobacco Use   • Smoking status: Never Smoker   • Smokeless tobacco: Never Used   Vaping Use   • Vaping Use: Never used   Substance and Sexual Activity   • Alcohol use: Not Currently   • Drug use: Never   • " "Sexual activity: Yes     Partners: Male       SURGICAL HISTORY  patient denies any surgical history    CURRENT MEDICATIONS  Home Medications     Reviewed by Rachael Sharp R.N. (Registered Nurse) on 04/06/22 at 1504  Med List Status: Partial   Medication Last Dose Status   metoclopramide (REGLAN) 10 MG Tab not taking Active   Prenatal MV-Min-Fe Fum-FA-DHA (PRENATAL 1 PO) 4/5/2022 Active                ALLERGIES  Allergies   Allergen Reactions   • Hydrocodone-Acetaminophen      Other reaction(s): GI Intolerance       PHYSICAL EXAM  VITAL SIGNS: /103   Pulse 83   Temp 35.9 °C (96.7 °F) (Temporal)   Resp 14   Ht 1.549 m (5' 1\")   Wt 90.9 kg (200 lb 6.4 oz)   LMP 01/25/2022   SpO2 95%   Breastfeeding No   BMI 37.86 kg/m²     Pulse ox interpretation: I interpret this pulse ox as normal.  Constitutional: Alert sitting upright on ED bed. Pleasant 31 y.o. woman  HENT: No signs of trauma, Bilateral external ears normal, Nose normal.   Eyes: Pupils are equal, Conjunctiva normal, Non-icteric.   Neck: Normal range of motion, No tenderness, Supple, No stridor.   Cardiovascular: Normal rate and regular rhythm, no murmurs. Symmetric distal pulses. No cyanosis of extremities. No peripheral edema of extremities.  Thorax & Lungs: Normal breath sounds, No respiratory distress, No wheezing, No chest tenderness.   Abdomen: Soft, Some pelvic tenderness to palpation. No peritoneal signs.  Skin: Warm, Dry, No erythema, No rash.   Back: No midline bony tenderness, No CVA tenderness.   Musculoskeletal: Good range of motion in all major joints. No tenderness to palpation or major deformities noted.   Neurologic: Alert , Normal motor function, Normal sensory function, No focal deficits noted.   Psychiatric: Affect normal, Judgment normal, Mood normal.   Physical Exam    DIAGNOSTIC STUDIES / PROCEDURES    LABS  Pertinent Labs & Imaging studies reviewed. (See chart for details)  Labs Reviewed   COMP METABOLIC PANEL - Abnormal; " Notable for the following components:       Result Value    Bun 7 (*)     All other components within normal limits   HCG QUANTITATIVE - Abnormal; Notable for the following components:    Bhcg 248277.0 (*)     All other components within normal limits   URINALYSIS,CULTURE IF INDICATED - Abnormal; Notable for the following components:    Character Cloudy (*)     Leukocyte Esterase Small (*)     All other components within normal limits    Narrative:     Indication for culture:->Pregnant women: fever and/or  asymptomatic screening   URINE MICROSCOPIC (W/UA) - Abnormal; Notable for the following components:    WBC 10-20 (*)     RBC 2-5 (*)     Bacteria Few (*)     All other components within normal limits    Narrative:     Indication for culture:->Pregnant women: fever and/or  asymptomatic screening   CBC WITH DIFFERENTIAL   LIPASE   ESTIMATED GFR   RH TYPE FOR RHOGAM FROM E.D.    Narrative:     Print Consent?->No   URINE CULTURE(NEW)    Narrative:     Indication for culture:->Pregnant women: fever and/or  asymptomatic screening     RADIOLOGY  Pertinent Labs & Imaging studies reviewed. (See chart for details)  US-OB 1ST TRIMESTER WITH TRANSVAGINAL (COMBO)   Final Result      1.  Single living intrauterine pregnancy at 10 weeks, 2 days estimated gestational age.   2. There are small bilateral subchorionic hemorrhage, left slightly larger than right.          COURSE & MEDICAL DECISION MAKING  Pertinent Labs & Imaging studies reviewed. (See chart for details)    3:45 PM This is an emergent evaluation of a  31 y.o. female  approximately 7 weeks pregnant who presents with frequent vomiting. The differential diagnosis includes but is not limited to pregnancy hyperemesis, dehydration, electrolyte abnormalities, threatened AB. Plan to start IV fluids because she is not tolerating oral intake. She will receive antiemetics as well. Ordered for UA, HCG Vlad, Lipase, CMP, CBC with diff, and US-OB transvaginal.     6:32  PM  Findings on ultrasound of subchorionic hemorrhage.  Intrauterine pregnancy with a heart rate 170.  Since there is sign of subchorionic hemorrhage I have added an Rh to her work-up. Patient was reevaluated at bedside. I informed her the next steps in her plan of care. Patient verbalizes understanding and agreement to this plan of care.      8:10 PM Rh was negative. Plan for discharge. Patient verbalizes understanding and agreement to this plan of care.  She has an upcoming OB/GYN appointment.  Urine had a few bacteria and 10-20 white blood cells, it was also contaminated with some epithelial cells.  A urine culture will be reflexively sent.  She was prescribed a small amount of Zofran.    HYDRATION: Based on the patient's presentation of Acute Vomiting the patient was given IV fluids. IV Hydration was used because oral hydration was not adequate alone. Upon recheck following hydration, the patient was feeling much improved.  Able to tolerate oral intake now.  No longer feeling dehydrated.    The patient will return for worsening symptoms and is stable at the time of discharge. The patient verbalizes understanding. She plans to follow up with OBGYN. Guidance was provided on appropriate use of medications including driving under the influence, overdose, and side effects.     DISPOSITION:  Patient will be discharged home in stable condition.    FOLLOW UP:  [facility]- [address] [facility]  [address]  [facility] [address]  [phone]  Schedule an appointment as soon as possible for a visit       [facility], Emergency Dept  [address] [facility] [id number] [phone]    fever, worsening pain, large amount of vaginal bleeding, unable to stop vomiting      OUTPATIENT MEDICATIONS:  Discharge Medication List as of 4/6/2022  8:25 PM      START taking these medications    Details   ondansetron (ZOFRAN ODT) 4 MG TABLET DISPERSIBLE Take 1 Tablet by mouth every 6 hours as needed  for Nausea for up to 4 days., Disp-16 Tablet, R-0, Normal               FINAL IMPRESSION    ICD-10-CM   1. Non-intractable vomiting with nausea, unspecified vomiting type  R11.2   2. Subchorionic hemorrhage of placenta in first trimester, single or unspecified fetus  O41.8X10    O46.8X1        This dictation was created using voice recognition software. The accuracy of the dictation is limited to the abilities of the software. I expect there may be some errors of grammar and possibly content. The nursing notes were reviewed and certain aspects of this information were incorporated into this note.    Electronically signed by: Tere Bhat, 4/6/2022

## 2022-04-06 NOTE — ED TRIAGE NOTES
Chief Complaint   Patient presents with   • N/V     X2 days   • Pregnancy     7 weeks gestation   • Abdominal Pain     Generalized x2 days     Patient to triage via ambulation, with a steaedy gait, patient A&O x4.  Appropriate precautions in place.     Patient reports unable to keep food and water down, denies blood in vomitus, denies vaginal bleeding.    Explained wait time and triage process to pt. Pt placed back in lobby, told to notify ED tech or triage RN of any changes, verbalized understanding.

## 2022-04-07 NOTE — ED NOTES
Pt discharged home, pt's  picking patient up. Pt is A/O x 4, ambulatory with a steady gait. IV discontinued and gauze placed, pt in possession of belongings. Pt provided discharge education and information pertaining to medications and follow up appointments. Discussed signs and symptoms to return to the ER, patient verbalized understanding. Pt received copy of discharge instructions and verbalized understanding.

## 2022-04-07 NOTE — ED NOTES
Rounded on patient, pt stating she is able to tolerate small increments of water before feeling nausea. NS bolus complete, ice water provided and saltine crackers given to patient. POC discussed, pt verbalized understanding.

## 2022-04-08 LAB
BACTERIA UR CULT: NORMAL
SIGNIFICANT IND 70042: NORMAL
SITE SITE: NORMAL
SOURCE SOURCE: NORMAL

## 2022-04-11 ENCOUNTER — APPOINTMENT (OUTPATIENT)
Dept: OBGYN | Facility: CLINIC | Age: 32
End: 2022-04-11
Payer: COMMERCIAL

## 2022-04-18 ENCOUNTER — OFFICE VISIT (OUTPATIENT)
Dept: URGENT CARE | Facility: PHYSICIAN GROUP | Age: 32
End: 2022-04-18
Payer: COMMERCIAL

## 2022-04-18 VITALS
SYSTOLIC BLOOD PRESSURE: 122 MMHG | RESPIRATION RATE: 16 BRPM | HEART RATE: 92 BPM | HEIGHT: 61 IN | WEIGHT: 196.4 LBS | OXYGEN SATURATION: 96 % | BODY MASS INDEX: 37.08 KG/M2 | TEMPERATURE: 98.3 F | DIASTOLIC BLOOD PRESSURE: 58 MMHG

## 2022-04-18 DIAGNOSIS — R09.81 NASAL CONGESTION: ICD-10-CM

## 2022-04-18 DIAGNOSIS — Z3A.12 12 WEEKS GESTATION OF PREGNANCY: ICD-10-CM

## 2022-04-18 DIAGNOSIS — R05.9 COUGH: ICD-10-CM

## 2022-04-18 DIAGNOSIS — Z20.828 EXPOSURE TO INFLUENZA: ICD-10-CM

## 2022-04-18 LAB
FLUAV+FLUBV AG SPEC QL IA: NEGATIVE
INT CON NEG: NORMAL
INT CON POS: NORMAL

## 2022-04-18 PROCEDURE — 99214 OFFICE O/P EST MOD 30 MIN: CPT | Performed by: FAMILY MEDICINE

## 2022-04-18 PROCEDURE — 87804 INFLUENZA ASSAY W/OPTIC: CPT | Performed by: FAMILY MEDICINE

## 2022-04-18 RX ORDER — DIPHENHYDRAMINE HCL 25 MG
25 TABLET ORAL EVERY 6 HOURS PRN
COMMUNITY
End: 2022-04-20

## 2022-04-18 RX ORDER — OSELTAMIVIR PHOSPHATE 75 MG/1
75 CAPSULE ORAL 2 TIMES DAILY
Qty: 10 CAPSULE | Refills: 0 | Status: SHIPPED | OUTPATIENT
Start: 2022-04-18 | End: 2022-04-23

## 2022-04-18 RX ORDER — ACETAMINOPHEN 325 MG/1
650 TABLET ORAL EVERY 4 HOURS PRN
COMMUNITY
End: 2022-04-20

## 2022-04-18 ASSESSMENT — ENCOUNTER SYMPTOMS
EYE DISCHARGE: 0
NAUSEA: 1
VOMITING: 1
MYALGIAS: 0
EYE REDNESS: 0
WEIGHT LOSS: 0

## 2022-04-18 ASSESSMENT — FIBROSIS 4 INDEX: FIB4 SCORE: 0.42

## 2022-04-18 NOTE — PROGRESS NOTES
"Subjective     Kadi Edel Dykes is a 31 y.o. female who presents with Cough (Runny nose, phlegm, congestion, x3 days )            3 days productive cough without blood in sputum.  Nasal congestion.  Sore throat.  Fatigue.  + Shortness of breath suspects related to 12 wk pregnancy.  No wheezing.  No PMH asthma or pneumonia.  Exposed to influenza A in her home, both toddler son and daughter. No other aggravating or alleviating factors.      Review of Systems   Constitutional: Negative for weight loss.   Eyes: Negative for discharge and redness.   Gastrointestinal: Positive for nausea and vomiting ( Associated with pregnancy).   Musculoskeletal: Negative for joint pain and myalgias.   Skin: Negative for itching and rash.              Objective     /58 (BP Location: Right arm, Patient Position: Sitting, BP Cuff Size: Adult)   Pulse 92   Temp 36.8 °C (98.3 °F) (Temporal)   Resp 16   Ht 1.549 m (5' 1\")   Wt 89.1 kg (196 lb 6.4 oz)   LMP 01/25/2022   SpO2 96%   BMI 37.11 kg/m²      Physical Exam  Constitutional:       General: She is not in acute distress.     Appearance: She is well-developed.   HENT:      Head: Normocephalic and atraumatic.      Nose: Congestion present.      Mouth/Throat:      Mouth: Mucous membranes are moist.      Pharynx: No posterior oropharyngeal erythema.   Eyes:      Conjunctiva/sclera: Conjunctivae normal.   Cardiovascular:      Rate and Rhythm: Normal rate and regular rhythm.      Heart sounds: Normal heart sounds. No murmur heard.  Pulmonary:      Effort: Pulmonary effort is normal.      Breath sounds: Normal breath sounds. No wheezing.   Skin:     General: Skin is warm and dry.      Findings: No rash.   Neurological:      Mental Status: She is alert and oriented to person, place, and time.                             Assessment & Plan       poct influenza negative    1. Cough  POCT Influenza A/B    oseltamivir (TAMIFLU) 75 MG Cap   2. Nasal congestion  POCT Influenza A/B "    oseltamivir (TAMIFLU) 75 MG Cap   3. Exposure to influenza  oseltamivir (TAMIFLU) 75 MG Cap   4. 12 weeks gestation of pregnancy  oseltamivir (TAMIFLU) 75 MG Cap     Differential diagnosis, natural history, supportive care, and indications for immediate follow-up discussed at length.

## 2022-04-20 ENCOUNTER — GYNECOLOGY VISIT (OUTPATIENT)
Dept: OBGYN | Facility: CLINIC | Age: 32
End: 2022-04-20
Payer: COMMERCIAL

## 2022-04-20 VITALS — WEIGHT: 194 LBS | SYSTOLIC BLOOD PRESSURE: 118 MMHG | BODY MASS INDEX: 36.66 KG/M2 | DIASTOLIC BLOOD PRESSURE: 64 MMHG

## 2022-04-20 DIAGNOSIS — O21.9 VOMITING OR NAUSEA OF PREGNANCY: ICD-10-CM

## 2022-04-20 DIAGNOSIS — N93.8 DUB (DYSFUNCTIONAL UTERINE BLEEDING): ICD-10-CM

## 2022-04-20 PROBLEM — Z34.91 SUPERVISION OF NORMAL PREGNANCY IN FIRST TRIMESTER: Status: ACTIVE | Noted: 2019-12-03

## 2022-04-20 LAB
INT CON NEG: NORMAL
INT CON POS: NORMAL
POC URINE PREGNANCY TEST: POSITIVE

## 2022-04-20 PROCEDURE — 81025 URINE PREGNANCY TEST: CPT | Performed by: OBSTETRICS & GYNECOLOGY

## 2022-04-20 PROCEDURE — 99203 OFFICE O/P NEW LOW 30 MIN: CPT | Mod: 25 | Performed by: OBSTETRICS & GYNECOLOGY

## 2022-04-20 PROCEDURE — 76830 TRANSVAGINAL US NON-OB: CPT | Performed by: OBSTETRICS & GYNECOLOGY

## 2022-04-20 RX ORDER — PROMETHAZINE HYDROCHLORIDE 25 MG/1
25 TABLET ORAL EVERY 6 HOURS PRN
Qty: 30 TABLET | Refills: 2 | Status: SHIPPED | OUTPATIENT
Start: 2022-04-20 | End: 2022-07-07

## 2022-04-20 ASSESSMENT — FIBROSIS 4 INDEX: FIB4 SCORE: 0.42

## 2022-04-20 NOTE — PATIENT INSTRUCTIONS
Morning Sickness    Morning sickness is when you feel sick to your stomach (nauseous) during pregnancy. You may feel sick to your stomach and throw up (vomit). You may feel sick in the morning, but you can feel this way at any time of day. Some women feel very sick to their stomach and cannot stop throwing up (hyperemesis gravidarum).  Follow these instructions at home:  Medicines  · Take over-the-counter and prescription medicines only as told by your doctor. Do not take any medicines until you talk with your doctor about them first.  · Taking multivitamins before getting pregnant can stop or lessen the harshness of morning sickness.  Eating and drinking  · Eat dry toast or crackers before getting out of bed.  · Eat 5 or 6 small meals a day.  · Eat dry and bland foods like rice and baked potatoes.  · Do not eat greasy, fatty, or spicy foods.  · Have someone cook for you if the smell of food causes you to feel sick or throw up.  · If you feel sick to your stomach after taking prenatal vitamins, take them at night or with a snack.  · Eat protein when you need a snack. Nuts, yogurt, and cheese are good choices.  · Drink fluids throughout the day.  · Try ginger ale made with real ginger, ginger tea made from fresh grated ginger, or ginger candies.  General instructions  · Do not use any products that have nicotine or tobacco in them, such as cigarettes and e-cigarettes. If you need help quitting, ask your doctor.  · Use an air purifier to keep the air in your house free of smells.  · Get lots of fresh air.  · Try to avoid smells that make you feel sick.  · Try:  ? Wearing a bracelet that is used for seasickness (acupressure wristband).  ? Going to a doctor who puts thin needles into certain body points (acupuncture) to improve how you feel.  Contact a doctor if:  · You need medicine to feel better.  · You feel dizzy or light-headed.  · You are losing weight.  Get help right away if:  · You feel very sick to your  stomach and cannot stop throwing up.  · You pass out (faint).  · You have very bad pain in your belly.  Summary  · Morning sickness is when you feel sick to your stomach (nauseous) during pregnancy.  · You may feel sick in the morning, but you can feel this way at any time of day.  · Making some changes to what you eat may help your symptoms go away.  This information is not intended to replace advice given to you by your health care provider. Make sure you discuss any questions you have with your health care provider.  Document Released: 01/25/2006 Document Revised: 11/30/2018 Document Reviewed: 01/18/2018  [employer] Patient Education © 2020 [employer]    First Trimester of Pregnancy  The first trimester of pregnancy is from week 1 until the end of week 13 (months 1 through 3). A week after a sperm fertilizes an egg, the egg will implant on the wall of the uterus. This embryo will begin to develop into a baby. Genes from you and your partner will form the baby. The male genes will determine whether the baby will be a boy or a girl. At 6-8 weeks, the eyes and face will be formed, and the heartbeat can be seen on ultrasound. At the end of 12 weeks, all the baby's organs will be formed.  Now that you are pregnant, you will want to do everything you can to have a healthy baby. Two of the most important things are to get good prenatal care and to follow your health care provider's instructions. Prenatal care is all the medical care you receive before the baby's birth. This care will help prevent, find, and treat any problems during the pregnancy and childbirth.  Body changes during your first trimester  Your body goes through many changes during pregnancy. The changes vary from woman to woman.  · You may gain or lose a couple of pounds at first.  · You may feel sick to your stomach (nauseous) and you may throw up (vomit). If the vomiting is uncontrollable, call your health care provider.  · You may tire easily.  · You  may develop headaches that can be relieved by medicines. All medicines should be approved by your health care provider.  · You may urinate more often. Painful urination may mean you have a bladder infection.  · You may develop heartburn as a result of your pregnancy.  · You may develop constipation because certain hormones are causing the muscles that push stool through your intestines to slow down.  · You may develop hemorrhoids or swollen veins (varicose veins).  · Your breasts may begin to grow larger and become tender. Your nipples may stick out more, and the tissue that surrounds them (areola) may become darker.  · Your gums may bleed and may be sensitive to brushing and flossing.  · Dark spots or blotches (chloasma, mask of pregnancy) may develop on your face. This will likely fade after the baby is born.  · Your menstrual periods will stop.  · You may have a loss of appetite.  · You may develop cravings for certain kinds of food.  · You may have changes in your emotions from day to day, such as being excited to be pregnant or being concerned that something may go wrong with the pregnancy and baby.  · You may have more vivid and strange dreams.  · You may have changes in your hair. These can include thickening of your hair, rapid growth, and changes in texture. Some women also have hair loss during or after pregnancy, or hair that feels dry or thin. Your hair will most likely return to normal after your baby is born.  What to expect at prenatal visits  During a routine prenatal visit:  · You will be weighed to make sure you and the baby are growing normally.  · Your blood pressure will be taken.  · Your abdomen will be measured to track your baby's growth.  · The fetal heartbeat will be listened to between weeks 10 and 14 of your pregnancy.  · Test results from any previous visits will be discussed.  Your health care provider may ask you:  · How you are feeling.  · If you are feeling the baby move.  · If you  have had any abnormal symptoms, such as leaking fluid, bleeding, severe headaches, or abdominal cramping.  · If you are using any tobacco products, including cigarettes, chewing tobacco, and electronic cigarettes.  · If you have any questions.  Other tests that may be performed during your first trimester include:  · Blood tests to find your blood type and to check for the presence of any previous infections. The tests will also be used to check for low iron levels (anemia) and protein on red blood cells (Rh antibodies). Depending on your risk factors, or if you previously had diabetes during pregnancy, you may have tests to check for high blood sugar that affects pregnant women (gestational diabetes).  · Urine tests to check for infections, diabetes, or protein in the urine.  · An ultrasound to confirm the proper growth and development of the baby.  · Fetal screens for spinal cord problems (spina bifida) and Down syndrome.  · HIV (human immunodeficiency virus) testing. Routine prenatal testing includes screening for HIV, unless you choose not to have this test.  · You may need other tests to make sure you and the baby are doing well.  Follow these instructions at home:  Medicines  · Follow your health care provider's instructions regarding medicine use. Specific medicines may be either safe or unsafe to take during pregnancy.  · Take a prenatal vitamin that contains at least 600 micrograms (mcg) of folic acid.  · If you develop constipation, try taking a stool softener if your health care provider approves.  Eating and drinking    · Eat a balanced diet that includes fresh fruits and vegetables, whole grains, good sources of protein such as meat, eggs, or tofu, and low-fat dairy. Your health care provider will help you determine the amount of weight gain that is right for you.  · Avoid raw meat and uncooked cheese. These carry germs that can cause birth defects in the baby.  · Eating four or five small meals rather  than three large meals a day may help relieve nausea and vomiting. If you start to feel nauseous, eating a few soda crackers can be helpful. Drinking liquids between meals, instead of during meals, also seems to help ease nausea and vomiting.  · Limit foods that are high in fat and processed sugars, such as fried and sweet foods.  · To prevent constipation:  ? Eat foods that are high in fiber, such as fresh fruits and vegetables, whole grains, and beans.  ? Drink enough fluid to keep your urine clear or pale yellow.  Activity  · Exercise only as directed by your health care provider. Most women can continue their usual exercise routine during pregnancy. Try to exercise for 30 minutes at least 5 days a week. Exercising will help you:  ? Control your weight.  ? Stay in shape.  ? Be prepared for labor and delivery.  · Experiencing pain or cramping in the lower abdomen or lower back is a good sign that you should stop exercising. Check with your health care provider before continuing with normal exercises.  · Try to avoid standing for long periods of time. Move your legs often if you must  one place for a long time.  · Avoid heavy lifting.  · Wear low-heeled shoes and practice good posture.  · You may continue to have sex unless your health care provider tells you not to.  Relieving pain and discomfort  · Wear a good support bra to relieve breast tenderness.  · Take warm sitz baths to soothe any pain or discomfort caused by hemorrhoids. Use hemorrhoid cream if your health care provider approves.  · Rest with your legs elevated if you have leg cramps or low back pain.  · If you develop varicose veins in your legs, wear support hose. Elevate your feet for 15 minutes, 3-4 times a day. Limit salt in your diet.  Prenatal care  · Schedule your prenatal visits by the twelfth week of pregnancy. They are usually scheduled monthly at first, then more often in the last 2 months before delivery.  · Write down your  questions. Take them to your prenatal visits.  · Keep all your prenatal visits as told by your health care provider. This is important.  Safety  · Wear your seat belt at all times when driving.  · Make a list of emergency phone numbers, including numbers for family, friends, the hospital, and police and fire departments.  General instructions  · Ask your health care provider for a referral to a local prenatal education class. Begin classes no later than the beginning of month 6 of your pregnancy.  · Ask for help if you have counseling or nutritional needs during pregnancy. Your health care provider can offer advice or refer you to specialists for help with various needs.  · Do not use hot tubs, steam rooms, or saunas.  · Do not douche or use tampons or scented sanitary pads.  · Do not cross your legs for long periods of time.  · Avoid cat litter boxes and soil used by cats. These carry germs that can cause birth defects in the baby and possibly loss of the fetus by miscarriage or stillbirth.  · Avoid all smoking, herbs, alcohol, and medicines not prescribed by your health care provider. Chemicals in these products affect the formation and growth of the baby.  · Do not use any products that contain nicotine or tobacco, such as cigarettes and e-cigarettes. If you need help quitting, ask your health care provider. You may receive counseling support and other resources to help you quit.  · Schedule a dentist appointment. At home, brush your teeth with a soft toothbrush and be gentle when you floss.  Contact a health care provider if:  · You have dizziness.  · You have mild pelvic cramps, pelvic pressure, or nagging pain in the abdominal area.  · You have persistent nausea, vomiting, or diarrhea.  · You have a bad smelling vaginal discharge.  · You have pain when you urinate.  · You notice increased swelling in your face, hands, legs, or ankles.  · You are exposed to fifth disease or chickenpox.  · You are exposed to  [place] measles (rubella) and have never had it.  Get help right away if:  · You have a fever.  · You are leaking fluid from your vagina.  · You have spotting or bleeding from your vagina.  · You have severe abdominal cramping or pain.  · You have rapid weight gain or loss.  · You vomit blood or material that looks like coffee grounds.  · You develop a severe headache.  · You have shortness of breath.  · You have any kind of trauma, such as from a fall or a car accident.  Summary  · The first trimester of pregnancy is from week 1 until the end of week 13 (months 1 through 3).  · Your body goes through many changes during pregnancy. The changes vary from woman to woman.  · You will have routine prenatal visits. During those visits, your health care provider will examine you, discuss any test results you may have, and talk with you about how you are feeling.  This information is not intended to replace advice given to you by your health care provider. Make sure you discuss any questions you have with your health care provider.  Document Released: 12/12/2002 Document Revised: 11/30/2018 Document Reviewed: 11/29/2017  [employer] Patient Education © 2020 [employer]

## 2022-04-20 NOTE — NON-PROVIDER
Patient here for GYN/DUB.  UPT= POSITIVE  LMP= 01/25/2022  DOROTHY= 11/01/2022  GA= 12w 1 d  Last pap  Phone number:  Pharmacy verified  c/o  Nausea and and and cramping

## 2022-04-20 NOTE — PROGRESS NOTES
31 y.o.  12w1d By LMP Patient's last menstrual period was 2022.  here for confirmation of pregnancy. Pt was seen at the ER on 2022 due to N/V of pregnancy. She was confirmed pregnant with 10 week US c/w LMP. Pt has not gotten any other PNC to date. She denies any Vb/cramping, reports a lot N/V, as is per usual for her during pregnancy. She is currently taking VitB6 +unisom and prn zofran. She would like some additional medication as her zofran is running out. Pt is taking a PNV +DHA. She is overall well, without complaints.    COVID vaccine: Yes, due for booster  Flu vaccine: No, reports recent flu infection     I have reviewed the patients'  medical, obstetrical,social, and family histories, medications and available lab results.    No pain, bleeding, unusual discharge or leeking              Allergies: Hydrocodone-acetaminophen    Past Medical History:   Diagnosis Date   • Patient denies medical problems      No past surgical history on file.    Objective:There were no vitals taken for this visit.     HEENT:atraumatic  Abdomen:Soft, nontender, no hernias, masses, or organomegaly  Extremities:Normal    Ultrasound:  First trimester findings: Gestational sac summary: yolk sac seen, fetal cardiac activity, CRL: 6.29 cm AUA 12w5d   Fetal cardiac activity: present    Assessment: 30 yo  @ 12w1d by LMP c/w 10 wk US EDC 2022 doing well       Plan:  -Pt encouraged to continue PNV+DHA  -SAB precautions reviewed  -1st tri prenatal labs ordered  -Phenergan ordered. Discussed antinausea strategies   -Discussed genetic screening, pt would like to proceed  -Encouraged COVID and flu vaccines  -Discussed dos and don'ts of pregnancy  -RTC in 2 weeks for initial OB appt.

## 2022-04-21 ENCOUNTER — APPOINTMENT (OUTPATIENT)
Dept: RADIOLOGY | Facility: MEDICAL CENTER | Age: 32
End: 2022-04-21
Attending: EMERGENCY MEDICINE
Payer: COMMERCIAL

## 2022-04-21 ENCOUNTER — HOSPITAL ENCOUNTER (EMERGENCY)
Facility: MEDICAL CENTER | Age: 32
End: 2022-04-21
Attending: EMERGENCY MEDICINE
Payer: COMMERCIAL

## 2022-04-21 VITALS
BODY MASS INDEX: 36.46 KG/M2 | RESPIRATION RATE: 16 BRPM | TEMPERATURE: 97 F | HEART RATE: 80 BPM | HEIGHT: 61 IN | SYSTOLIC BLOOD PRESSURE: 126 MMHG | DIASTOLIC BLOOD PRESSURE: 77 MMHG | WEIGHT: 193.12 LBS | OXYGEN SATURATION: 98 %

## 2022-04-21 DIAGNOSIS — O21.0 HYPEREMESIS GRAVIDARUM: ICD-10-CM

## 2022-04-21 DIAGNOSIS — N39.0 ACUTE UTI: ICD-10-CM

## 2022-04-21 LAB
ALBUMIN SERPL BCP-MCNC: 4.4 G/DL (ref 3.2–4.9)
ALBUMIN/GLOB SERPL: 1.3 G/DL
ALP SERPL-CCNC: 65 U/L (ref 30–99)
ALT SERPL-CCNC: 33 U/L (ref 2–50)
ANION GAP SERPL CALC-SCNC: 15 MMOL/L (ref 7–16)
APPEARANCE UR: ABNORMAL
AST SERPL-CCNC: 26 U/L (ref 12–45)
B-HCG SERPL-ACNC: ABNORMAL MIU/ML (ref 0–5)
BACTERIA #/AREA URNS HPF: ABNORMAL /HPF
BASOPHILS # BLD AUTO: 0.2 % (ref 0–1.8)
BASOPHILS # BLD: 0.02 K/UL (ref 0–0.12)
BILIRUB SERPL-MCNC: 0.8 MG/DL (ref 0.1–1.5)
BILIRUB UR QL STRIP.AUTO: NEGATIVE
BUN SERPL-MCNC: 8 MG/DL (ref 8–22)
CALCIUM SERPL-MCNC: 9.5 MG/DL (ref 8.5–10.5)
CHLORIDE SERPL-SCNC: 102 MMOL/L (ref 96–112)
CO2 SERPL-SCNC: 21 MMOL/L (ref 20–33)
COLOR UR: ABNORMAL
CREAT SERPL-MCNC: 0.53 MG/DL (ref 0.5–1.4)
EOSINOPHIL # BLD AUTO: 0.04 K/UL (ref 0–0.51)
EOSINOPHIL NFR BLD: 0.5 % (ref 0–6.9)
EPI CELLS #/AREA URNS HPF: ABNORMAL /HPF
ERYTHROCYTE [DISTWIDTH] IN BLOOD BY AUTOMATED COUNT: 37.6 FL (ref 35.9–50)
GFR SERPLBLD CREATININE-BSD FMLA CKD-EPI: 127 ML/MIN/1.73 M 2
GLOBULIN SER CALC-MCNC: 3.4 G/DL (ref 1.9–3.5)
GLUCOSE SERPL-MCNC: 98 MG/DL (ref 65–99)
GLUCOSE UR STRIP.AUTO-MCNC: NEGATIVE MG/DL
HCT VFR BLD AUTO: 44.4 % (ref 37–47)
HGB BLD-MCNC: 15.7 G/DL (ref 12–16)
HYALINE CASTS #/AREA URNS LPF: ABNORMAL /LPF
IMM GRANULOCYTES # BLD AUTO: 0.02 K/UL (ref 0–0.11)
IMM GRANULOCYTES NFR BLD AUTO: 0.2 % (ref 0–0.9)
KETONES UR STRIP.AUTO-MCNC: >=160 MG/DL
LEUKOCYTE ESTERASE UR QL STRIP.AUTO: ABNORMAL
LIPASE SERPL-CCNC: 22 U/L (ref 11–82)
LYMPHOCYTES # BLD AUTO: 1.98 K/UL (ref 1–4.8)
LYMPHOCYTES NFR BLD: 23.4 % (ref 22–41)
MCH RBC QN AUTO: 28.5 PG (ref 27–33)
MCHC RBC AUTO-ENTMCNC: 35.4 G/DL (ref 33.6–35)
MCV RBC AUTO: 80.6 FL (ref 81.4–97.8)
MICRO URNS: ABNORMAL
MONOCYTES # BLD AUTO: 0.37 K/UL (ref 0–0.85)
MONOCYTES NFR BLD AUTO: 4.4 % (ref 0–13.4)
NEUTROPHILS # BLD AUTO: 6.04 K/UL (ref 2–7.15)
NEUTROPHILS NFR BLD: 71.3 % (ref 44–72)
NITRITE UR QL STRIP.AUTO: NEGATIVE
NRBC # BLD AUTO: 0 K/UL
NRBC BLD-RTO: 0 /100 WBC
PH UR STRIP.AUTO: 5.5 [PH] (ref 5–8)
PLATELET # BLD AUTO: 250 K/UL (ref 164–446)
PMV BLD AUTO: 10.3 FL (ref 9–12.9)
POTASSIUM SERPL-SCNC: 3.6 MMOL/L (ref 3.6–5.5)
PROT SERPL-MCNC: 7.8 G/DL (ref 6–8.2)
PROT UR QL STRIP: 30 MG/DL
RBC # BLD AUTO: 5.51 M/UL (ref 4.2–5.4)
RBC # URNS HPF: ABNORMAL /HPF
RBC UR QL AUTO: NEGATIVE
SODIUM SERPL-SCNC: 138 MMOL/L (ref 135–145)
SP GR UR STRIP.AUTO: 1.03
UROBILINOGEN UR STRIP.AUTO-MCNC: 1 MG/DL
WBC # BLD AUTO: 8.5 K/UL (ref 4.8–10.8)
WBC #/AREA URNS HPF: ABNORMAL /HPF

## 2022-04-21 PROCEDURE — 36415 COLL VENOUS BLD VENIPUNCTURE: CPT

## 2022-04-21 PROCEDURE — 80053 COMPREHEN METABOLIC PANEL: CPT

## 2022-04-21 PROCEDURE — 83690 ASSAY OF LIPASE: CPT

## 2022-04-21 PROCEDURE — 99285 EMERGENCY DEPT VISIT HI MDM: CPT

## 2022-04-21 PROCEDURE — 700105 HCHG RX REV CODE 258: Performed by: EMERGENCY MEDICINE

## 2022-04-21 PROCEDURE — 84702 CHORIONIC GONADOTROPIN TEST: CPT

## 2022-04-21 PROCEDURE — 81001 URINALYSIS AUTO W/SCOPE: CPT

## 2022-04-21 PROCEDURE — 700111 HCHG RX REV CODE 636 W/ 250 OVERRIDE (IP): Performed by: EMERGENCY MEDICINE

## 2022-04-21 PROCEDURE — 76801 OB US < 14 WKS SINGLE FETUS: CPT

## 2022-04-21 PROCEDURE — 85025 COMPLETE CBC W/AUTO DIFF WBC: CPT

## 2022-04-21 PROCEDURE — 96374 THER/PROPH/DIAG INJ IV PUSH: CPT

## 2022-04-21 RX ORDER — CEPHALEXIN 500 MG/1
500 CAPSULE ORAL 4 TIMES DAILY
Qty: 40 CAPSULE | Refills: 0 | Status: SHIPPED | OUTPATIENT
Start: 2022-04-21 | End: 2022-09-13

## 2022-04-21 RX ORDER — SODIUM CHLORIDE 9 MG/ML
1000 INJECTION, SOLUTION INTRAVENOUS ONCE
Status: COMPLETED | OUTPATIENT
Start: 2022-04-21 | End: 2022-04-21

## 2022-04-21 RX ORDER — ONDANSETRON 2 MG/ML
4 INJECTION INTRAMUSCULAR; INTRAVENOUS ONCE
Status: COMPLETED | OUTPATIENT
Start: 2022-04-21 | End: 2022-04-21

## 2022-04-21 RX ADMIN — SODIUM CHLORIDE 1000 ML: 9 INJECTION, SOLUTION INTRAVENOUS at 18:24

## 2022-04-21 RX ADMIN — ONDANSETRON 4 MG: 2 INJECTION INTRAMUSCULAR; INTRAVENOUS at 18:24

## 2022-04-21 ASSESSMENT — FIBROSIS 4 INDEX: FIB4 SCORE: 0.42

## 2022-04-21 NOTE — ED TRIAGE NOTES
Pt ambulated to triage with   Chief Complaint   Patient presents with   • Flu Like Symptoms     +flu on Tuesday started on Tamiflu; 12 wks and 2 day pregnant, started to get lightheaded, streaks of blood in vomit with nausea, unable to sleep, decreased urine output.    • Pregnancy   • N/V   • Abdominal Cramping     Slight cramping.      Pt Informed regarding triage process and verbalized understanding to inform triage tech or RN for any changes in condition. Placed in lobby.

## 2022-04-22 NOTE — ED NOTES
Patient discharged from [facility] ED to home with self. Discharge teaching completed at bedside and patient signature obtained. All questions and concerns addressed. PIV removed. All pt belongings with pt at time of discharge.

## 2022-04-22 NOTE — ED PROVIDER NOTES
ED Provider Note    CHIEF COMPLAINT  Chief Complaint   Patient presents with   • Flu Like Symptoms     +flu on Tuesday started on Tamiflu; 12 wks and 2 day pregnant, started to get lightheaded, streaks of blood in vomit with nausea, unable to sleep, decreased urine output.    • Pregnancy   • N/V   • Abdominal Cramping     Slight cramping.        HPI  Gisela Dykes is a 31 y.o. female here for evaluation of hyperemesis and influenza.  Patient was seen by her OB, and diagnosed with the flu, and given Tamiflu.  She is currently 12 weeks pregnant.  She started to have some vomiting, with some straining and bloody mucus, but no clots and no hemorrhagic vomiting.  Patient has some epigastric pain from the vomiting, but no lower pain, and no chest pain or shortness of breath.  She has no fever chills.  No abnormal vaginal discharge or bleeding.  Patient is G4, P2 SAB 1.  She has been taking Compazine, B6, and Benadryl from her OB.  This is all with minimal relief.  She states that she had hyperemesis with all of her previous pregnancies.  Patient also complains of mild dysuria with urination.  No frequency or urgency.      ROS  See HPI for further details, o/w negative.     PAST MEDICAL HISTORY   has a past medical history of Patient denies medical problems.    SOCIAL HISTORY  Social History     Tobacco Use   • Smoking status: Never Smoker   • Smokeless tobacco: Never Used   Vaping Use   • Vaping Use: Never used   Substance and Sexual Activity   • Alcohol use: Not Currently   • Drug use: Never   • Sexual activity: Yes     Partners: Male       Family History  No bleeding disorders    SURGICAL HISTORY  patient denies any surgical history    CURRENT MEDICATIONS  Home Medications     Reviewed by Arcelia De Los Santos R.N. (Registered Nurse) on 04/21/22 at 1331  Med List Status: Partial   Medication Last Dose Status   oseltamivir (TAMIFLU) 75 MG Cap 4/21/2022 Active   Prenatal MV-Min-Fe Fum-FA-DHA (PRENATAL 1 PO)  4/21/2022 Active   promethazine (PHENERGAN) 25 MG Tab 4/20/2022 Active                ALLERGIES  Allergies   Allergen Reactions   • Hydrocodone-Acetaminophen      Other reaction(s): GI Intolerance       REVIEW OF SYSTEMS  See HPI for further details. Review of systems as above, otherwise all other systems are negative.     PHYSICAL EXAM  Constitutional: Well developed, well nourished. No acute distress.  HEENT: Normocephalic, atraumatic. Posterior pharynx clear and moist.  Eyes:  EOMI. Normal sclera.  Neck: Supple, Full range of motion, nontender.  Chest/Pulmonary: clear to ausculation. Symmetrical expansion.   Cardio: Regular rate and rhythm with no murmur.   Abdomen: Soft, mild epigastric tenderness, no peritoneal signs. No guarding. No palpable masses.  Musculoskeletal: No deformity, no edema, neurovascular intact.   Neuro: Clear speech, appropriate, cooperative, cranial nerves II-XII grossly intact.  Psych: Normal mood and affect    PROCEDURES     MEDICAL RECORD  I have reviewed patient's medical record and pertinent results are listed.    COURSE & MEDICAL DECISION MAKING  I have reviewed any medical record information, laboratory studies and radiographic results as noted above.    6:15 PM  Patient states that she has history of hyperemesis, and that she has seen her OB yesterday for the same.  She is here because she feels as though she needs IV fluid hydration.  She states that after this is done, she was feels much better, and is able to go home.  Patient has no vaginal bleeding or discharge.  Her ultrasound shows a 12-week and 2-day intrauterine pregnancy.    The pt is feeling better after iv fluids.     HYDRATION: Based on the patient's presentation of Dehydration the patient was given IV fluids. IV Hydration was used because oral hydration was not adequate alone. Upon recheck following hydration, the patient was improved.      If you have had any blood pressure issues while here in the emergency  department, please see your doctor for a further evaluation or work up.    Differential diagnoses include but not limited to: Threatened miscarriage, hyperemesis, electrolyte disturbance, UTI    This patient presents with hyperemesis and acute UTI.  At this time, I have counseled the patient/family regarding their medications, pain control, and follow up.  They will continue their medications, if any, as prescribed.  They will return immediately for any worsening symptoms and/or any other medical concerns.  They will see their doctor, or contact the doctor provided, in 1-2 days for follow up.       FINAL IMPRESSION  1. Hyperemesis gravidarum  cephALEXin (KEFLEX) 500 MG Cap   2. Acute UTI             Electronically signed by: Rick Benedict D.O., 4/21/2022 6:12 PM

## 2022-04-22 NOTE — ED NOTES
Assumed report and patient care from Tone CELESTE. Patient is resting comfortably in bed with no signs of labored breathing or restlessness. POC discussed. No questions or concerns at this time. Whiteboard updated. Pending discharge after fluid bolus completion. Safety measures in place, call light within reach.

## 2022-05-04 ENCOUNTER — INITIAL PRENATAL (OUTPATIENT)
Dept: OBGYN | Facility: CLINIC | Age: 32
End: 2022-05-04
Payer: COMMERCIAL

## 2022-05-04 ENCOUNTER — HOSPITAL ENCOUNTER (OUTPATIENT)
Facility: MEDICAL CENTER | Age: 32
End: 2022-05-04
Attending: OBSTETRICS & GYNECOLOGY
Payer: COMMERCIAL

## 2022-05-04 VITALS — DIASTOLIC BLOOD PRESSURE: 89 MMHG | BODY MASS INDEX: 36.66 KG/M2 | SYSTOLIC BLOOD PRESSURE: 118 MMHG | WEIGHT: 194 LBS

## 2022-05-04 DIAGNOSIS — O21.9 VOMITING OR NAUSEA OF PREGNANCY: ICD-10-CM

## 2022-05-04 DIAGNOSIS — Z34.82 ENCOUNTER FOR SUPERVISION OF OTHER NORMAL PREGNANCY IN SECOND TRIMESTER: ICD-10-CM

## 2022-05-04 PROCEDURE — 87624 HPV HI-RISK TYP POOLED RSLT: CPT

## 2022-05-04 PROCEDURE — 87591 N.GONORRHOEAE DNA AMP PROB: CPT

## 2022-05-04 PROCEDURE — 87510 GARDNER VAG DNA DIR PROBE: CPT

## 2022-05-04 PROCEDURE — 0500F INITIAL PRENATAL CARE VISIT: CPT | Performed by: OBSTETRICS & GYNECOLOGY

## 2022-05-04 PROCEDURE — 88175 CYTOPATH C/V AUTO FLUID REDO: CPT

## 2022-05-04 PROCEDURE — 87491 CHLMYD TRACH DNA AMP PROBE: CPT

## 2022-05-04 PROCEDURE — 87660 TRICHOMONAS VAGIN DIR PROBE: CPT

## 2022-05-04 PROCEDURE — 87480 CANDIDA DNA DIR PROBE: CPT

## 2022-05-04 ASSESSMENT — FIBROSIS 4 INDEX: FIB4 SCORE: 0.54

## 2022-05-04 NOTE — NON-PROVIDER
NOB today  LMP: 01/25/2022  Last pap: pt unsure   Phone # [phone]   Pharmacy confirmed  On PNV Yes

## 2022-05-04 NOTE — PROGRESS NOTES
31 y.o.  14w1d By LMP Patient's last menstrual period was 2022.  here for initial OB. Pt has not completed her 1st tri labs yet. She denies any Vb/ some minimal cramping, reports ongoing N/V She is taking the phenergan which is help a little . Lost 20# to date. Last pap smear was 4+ years ago, no h/o     COVID vaccine: Yes, due for booster  Flu vaccine: No, recent flu infection     I have reviewed the patients'  medical, obstetrical,social, and family histories, medications and available lab results.    Allergies: Hydrocodone-acetaminophen    Past Medical History:   Diagnosis Date   • Patient denies medical problems      Past Surgical History:   Procedure Laterality Date   • ABDOMINAL EXPLORATION  2021    gallbladder removed        Objective:/89   Wt 88 kg (194 lb)      HEENT:atraumatic Normocephalic  Abdomen: Soft, nontender, no hernias, masses, or organomegaly  Extremities: Normal  Pelvic: normal external genitalia, no erythema, no discharge, no vaginal discharge, normal vagina and normal vaginal tone, normal cervix, normal adnexa without tenderness. Pap smear obtained.     Assessment: 32 yo  @ 14w1d here for initial OB. W/ N/V of pregnancy, currenly lost about 20#    Plan:  -Pt reports the phenergan is helping a little. Still some Reglan at home, will add this as needed. Can consider zofran if symptoms do not improve.   -Pt encouraged to continue PNV+DHA  -SAB precautions reviewed  -1st tri prenatal labs +MSAFP ordered today  -Discussed genetic screening/anatomy scan,[facility]   -Encouraged COVID and flu vaccines  -Discussed dos and don'ts of pregnancy  -RTC in 4 weeks for [facility] appt.

## 2022-05-04 NOTE — PATIENT INSTRUCTIONS
Second Trimester of Pregnancy  The second trimester is from week 14 through week 27 (months 4 through 6). The second trimester is often a time when you feel your best. Your body has adjusted to being pregnant, and you begin to feel better physically. Usually, morning sickness has lessened or quit completely, you may have more energy, and you may have an increase in appetite. The second trimester is also a time when the fetus is growing rapidly. At the end of the sixth month, the fetus is about 9 inches long and weighs about 1½ pounds. You will likely begin to feel the baby move (quickening) between 16 and 20 weeks of pregnancy.  Body changes during your second trimester  Your body continues to go through many changes during your second trimester. The changes vary from woman to woman.  · Your weight will continue to increase. You will notice your lower abdomen bulging out.  · You may begin to get stretch marks on your hips, abdomen, and breasts.  · You may develop headaches that can be relieved by medicines. The medicines should be approved by your health care provider.  · You may urinate more often because the fetus is pressing on your bladder.  · You may develop or continue to have heartburn as a result of your pregnancy.  · You may develop constipation because certain hormones are causing the muscles that push waste through your intestines to slow down.  · You may develop hemorrhoids or swollen, bulging veins (varicose veins).  · You may have back pain. This is caused by:  ? Weight gain.  ? Pregnancy hormones that are relaxing the joints in your pelvis.  ? A shift in weight and the muscles that support your balance.  · Your breasts will continue to grow and they will continue to become tender.  · Your gums may bleed and may be sensitive to brushing and flossing.  · Dark spots or blotches (chloasma, mask of pregnancy) may develop on your face. This will likely fade after the baby is born.  · A dark line from your  belly button to the pubic area (linea nigra) may appear. This will likely fade after the baby is born.  · You may have changes in your hair. These can include thickening of your hair, rapid growth, and changes in texture. Some women also have hair loss during or after pregnancy, or hair that feels dry or thin. Your hair will most likely return to normal after your baby is born.  What to expect at prenatal visits  During a routine prenatal visit:  · You will be weighed to make sure you and the fetus are growing normally.  · Your blood pressure will be taken.  · Your abdomen will be measured to track your baby's growth.  · The fetal heartbeat will be listened to.  · Any test results from the previous visit will be discussed.  Your health care provider may ask you:  · How you are feeling.  · If you are feeling the baby move.  · If you have had any abnormal symptoms, such as leaking fluid, bleeding, severe headaches, or abdominal cramping.  · If you are using any tobacco products, including cigarettes, chewing tobacco, and electronic cigarettes.  · If you have any questions.  Other tests that may be performed during your second trimester include:  · Blood tests that check for:  ? Low iron levels (anemia).  ? High blood sugar that affects pregnant women (gestational diabetes) between 24 and 28 weeks.  ? Rh antibodies. This is to check for a protein on red blood cells (Rh factor).  · Urine tests to check for infections, diabetes, or protein in the urine.  · An ultrasound to confirm the proper growth and development of the baby.  · An amniocentesis to check for possible genetic problems.  · Fetal screens for spina bifida and Down syndrome.  · HIV (human immunodeficiency virus) testing. Routine prenatal testing includes screening for HIV, unless you choose not to have this test.  Follow these instructions at home:  Medicines  · Follow your health care provider's instructions regarding medicine use. Specific medicines may be  either safe or unsafe to take during pregnancy.  · Take a prenatal vitamin that contains at least 600 micrograms (mcg) of folic acid.  · If you develop constipation, try taking a stool softener if your health care provider approves.  Eating and drinking    · Eat a balanced diet that includes fresh fruits and vegetables, whole grains, good sources of protein such as meat, eggs, or tofu, and low-fat dairy. Your health care provider will help you determine the amount of weight gain that is right for you.  · Avoid raw meat and uncooked cheese. These carry germs that can cause birth defects in the baby.  · If you have low calcium intake from food, talk to your health care provider about whether you should take a daily calcium supplement.  · Limit foods that are high in fat and processed sugars, such as fried and sweet foods.  · To prevent constipation:  ? Drink enough fluid to keep your urine clear or pale yellow.  ? Eat foods that are high in fiber, such as fresh fruits and vegetables, whole grains, and beans.  Activity  · Exercise only as directed by your health care provider. Most women can continue their usual exercise routine during pregnancy. Try to exercise for 30 minutes at least 5 days a week. Stop exercising if you experience uterine contractions.  · Avoid heavy lifting, wear low heel shoes, and practice good posture.  · A sexual relationship may be continued unless your health care provider directs you otherwise.  Relieving pain and discomfort  · Wear a good support bra to prevent discomfort from breast tenderness.  · Take warm sitz baths to soothe any pain or discomfort caused by hemorrhoids. Use hemorrhoid cream if your health care provider approves.  · Rest with your legs elevated if you have leg cramps or low back pain.  · If you develop varicose veins, wear support hose. Elevate your feet for 15 minutes, 3-4 times a day. Limit salt in your diet.  Prenatal Care  · Write down your questions. Take them to  your prenatal visits.  · Keep all your prenatal visits as told by your health care provider. This is important.  Safety  · Wear your seat belt at all times when driving.  · Make a list of emergency phone numbers, including numbers for family, friends, the hospital, and police and fire departments.  General instructions  · Ask your health care provider for a referral to a local prenatal education class. Begin classes no later than the beginning of month 6 of your pregnancy.  · Ask for help if you have counseling or nutritional needs during pregnancy. Your health care provider can offer advice or refer you to specialists for help with various needs.  · Do not use hot tubs, steam rooms, or saunas.  · Do not douche or use tampons or scented sanitary pads.  · Do not cross your legs for long periods of time.  · Avoid cat litter boxes and soil used by cats. These carry germs that can cause birth defects in the baby and possibly loss of the fetus by miscarriage or stillbirth.  · Avoid all smoking, herbs, alcohol, and unprescribed drugs. Chemicals in these products can affect the formation and growth of the baby.  · Do not use any products that contain nicotine or tobacco, such as cigarettes and e-cigarettes. If you need help quitting, ask your health care provider.  · Visit your dentist if you have not gone yet during your pregnancy. Use a soft toothbrush to brush your teeth and be gentle when you floss.  Contact a health care provider if:  · You have dizziness.  · You have mild pelvic cramps, pelvic pressure, or nagging pain in the abdominal area.  · You have persistent nausea, vomiting, or diarrhea.  · You have a bad smelling vaginal discharge.  · You have pain when you urinate.  Get help right away if:  · You have a fever.  · You are leaking fluid from your vagina.  · You have spotting or bleeding from your vagina.  · You have severe abdominal cramping or pain.  · You have rapid weight gain or weight loss.  · You have  shortness of breath with chest pain.  · You notice sudden or extreme swelling of your face, hands, ankles, feet, or legs.  · You have not felt your baby move in over an hour.  · You have severe headaches that do not go away when you take medicine.  · You have vision changes.  Summary  · The second trimester is from week 14 through week 27 (months 4 through 6). It is also a time when the fetus is growing rapidly.  · Your body goes through many changes during pregnancy. The changes vary from woman to woman.  · Avoid all smoking, herbs, alcohol, and unprescribed drugs. These chemicals affect the formation and growth your baby.  · Do not use any tobacco products, such as cigarettes, chewing tobacco, and e-cigarettes. If you need help quitting, ask your health care provider.  · Contact your health care provider if you have any questions. Keep all prenatal visits as told by your health care provider. This is important.  This information is not intended to replace advice given to you by your health care provider. Make sure you discuss any questions you have with your health care provider.  Document Released: 12/12/2002 Document Revised: 04/10/2020 Document Reviewed: 01/23/2018  Elsevier Patient Education © 2020 [employer]

## 2022-05-05 DIAGNOSIS — Z34.82 ENCOUNTER FOR SUPERVISION OF OTHER NORMAL PREGNANCY IN SECOND TRIMESTER: ICD-10-CM

## 2022-05-05 LAB
AMBIGUOUS DTTM AMBI4: NORMAL
AMBIGUOUS DTTM AMBI4: NORMAL
CANDIDA DNA VAG QL PROBE+SIG AMP: NEGATIVE
G VAGINALIS DNA VAG QL PROBE+SIG AMP: NEGATIVE
SIGNIFICANT IND 70042: NORMAL
SITE SITE: NORMAL
SOURCE SOURCE: NORMAL
T VAGINALIS DNA VAG QL PROBE+SIG AMP: NEGATIVE

## 2022-05-06 LAB
C TRACH DNA GENITAL QL NAA+PROBE: NEGATIVE
CYTOLOGY REG CYTOL: NORMAL
HPV HR 12 DNA CVX QL NAA+PROBE: NEGATIVE
HPV16 DNA SPEC QL NAA+PROBE: NEGATIVE
HPV18 DNA SPEC QL NAA+PROBE: NEGATIVE
N GONORRHOEA DNA GENITAL QL NAA+PROBE: NEGATIVE
SPECIMEN SOURCE: NORMAL
SPECIMEN SOURCE: NORMAL

## 2022-05-26 ENCOUNTER — HOSPITAL ENCOUNTER (OUTPATIENT)
Dept: LAB | Facility: MEDICAL CENTER | Age: 32
End: 2022-05-26
Attending: OBSTETRICS & GYNECOLOGY
Payer: COMMERCIAL

## 2022-05-26 DIAGNOSIS — Z34.82 ENCOUNTER FOR SUPERVISION OF OTHER NORMAL PREGNANCY IN SECOND TRIMESTER: ICD-10-CM

## 2022-05-26 LAB
ABO GROUP BLD: NORMAL
BASOPHILS # BLD AUTO: 0.2 % (ref 0–1.8)
BASOPHILS # BLD: 0.02 K/UL (ref 0–0.12)
BLD GP AB SCN SERPL QL: NORMAL
C TRACH DNA SPEC QL NAA+PROBE: NEGATIVE
EOSINOPHIL # BLD AUTO: 0.05 K/UL (ref 0–0.51)
EOSINOPHIL NFR BLD: 0.6 % (ref 0–6.9)
ERYTHROCYTE [DISTWIDTH] IN BLOOD BY AUTOMATED COUNT: 43 FL (ref 35.9–50)
HBV SURFACE AG SER QL: NORMAL
HCT VFR BLD AUTO: 38.4 % (ref 37–47)
HCV AB SER QL: NORMAL
HGB BLD-MCNC: 13.3 G/DL (ref 12–16)
HIV 1+2 AB+HIV1 P24 AG SERPL QL IA: NORMAL
IMM GRANULOCYTES # BLD AUTO: 0.04 K/UL (ref 0–0.11)
IMM GRANULOCYTES NFR BLD AUTO: 0.5 % (ref 0–0.9)
LYMPHOCYTES # BLD AUTO: 1.92 K/UL (ref 1–4.8)
LYMPHOCYTES NFR BLD: 22.2 % (ref 22–41)
MCH RBC QN AUTO: 29.2 PG (ref 27–33)
MCHC RBC AUTO-ENTMCNC: 34.6 G/DL (ref 33.6–35)
MCV RBC AUTO: 84.4 FL (ref 81.4–97.8)
MONOCYTES # BLD AUTO: 0.45 K/UL (ref 0–0.85)
MONOCYTES NFR BLD AUTO: 5.2 % (ref 0–13.4)
N GONORRHOEA DNA SPEC QL NAA+PROBE: NEGATIVE
NEUTROPHILS # BLD AUTO: 6.17 K/UL (ref 2–7.15)
NEUTROPHILS NFR BLD: 71.3 % (ref 44–72)
NRBC # BLD AUTO: 0 K/UL
NRBC BLD-RTO: 0 /100 WBC
PLATELET # BLD AUTO: 276 K/UL (ref 164–446)
PMV BLD AUTO: 11.2 FL (ref 9–12.9)
RBC # BLD AUTO: 4.55 M/UL (ref 4.2–5.4)
RH BLD: NORMAL
RUBV AB SER QL: 58.9 IU/ML
SPECIMEN SOURCE: NORMAL
T PALLIDUM AB SER QL IA: NORMAL
WBC # BLD AUTO: 8.7 K/UL (ref 4.8–10.8)

## 2022-05-26 PROCEDURE — 86803 HEPATITIS C AB TEST: CPT

## 2022-05-26 PROCEDURE — 86900 BLOOD TYPING SEROLOGIC ABO: CPT

## 2022-05-26 PROCEDURE — 85025 COMPLETE CBC W/AUTO DIFF WBC: CPT

## 2022-05-26 PROCEDURE — 80307 DRUG TEST PRSMV CHEM ANLYZR: CPT

## 2022-05-26 PROCEDURE — 87340 HEPATITIS B SURFACE AG IA: CPT

## 2022-05-26 PROCEDURE — 86592 SYPHILIS TEST NON-TREP QUAL: CPT

## 2022-05-26 PROCEDURE — 86780 TREPONEMA PALLIDUM: CPT

## 2022-05-26 PROCEDURE — 82105 ALPHA-FETOPROTEIN SERUM: CPT

## 2022-05-26 PROCEDURE — 86762 RUBELLA ANTIBODY: CPT

## 2022-05-26 PROCEDURE — 36415 COLL VENOUS BLD VENIPUNCTURE: CPT

## 2022-05-26 PROCEDURE — 86901 BLOOD TYPING SEROLOGIC RH(D): CPT

## 2022-05-26 PROCEDURE — 87491 CHLMYD TRACH DNA AMP PROBE: CPT

## 2022-05-26 PROCEDURE — 86850 RBC ANTIBODY SCREEN: CPT

## 2022-05-26 PROCEDURE — 87591 N.GONORRHOEAE DNA AMP PROB: CPT

## 2022-05-26 PROCEDURE — 87389 HIV-1 AG W/HIV-1&-2 AB AG IA: CPT

## 2022-05-27 ENCOUNTER — ROUTINE PRENATAL (OUTPATIENT)
Dept: OBGYN | Facility: CLINIC | Age: 32
End: 2022-05-27
Payer: COMMERCIAL

## 2022-05-27 VITALS — WEIGHT: 194 LBS | SYSTOLIC BLOOD PRESSURE: 122 MMHG | BODY MASS INDEX: 36.66 KG/M2 | DIASTOLIC BLOOD PRESSURE: 83 MMHG

## 2022-05-27 DIAGNOSIS — O21.9 VOMITING OR NAUSEA OF PREGNANCY: ICD-10-CM

## 2022-05-27 DIAGNOSIS — Z34.82 ENCOUNTER FOR SUPERVISION OF OTHER NORMAL PREGNANCY IN SECOND TRIMESTER: ICD-10-CM

## 2022-05-27 PROBLEM — N83.209 OVARIAN CYST: Status: RESOLVED | Noted: 2021-09-16 | Resolved: 2022-05-27

## 2022-05-27 PROBLEM — Z34.92 SUPERVISION OF NORMAL PREGNANCY IN SECOND TRIMESTER: Status: ACTIVE | Noted: 2019-12-03

## 2022-05-27 PROCEDURE — 0502F SUBSEQUENT PRENATAL CARE: CPT | Performed by: OBSTETRICS & GYNECOLOGY

## 2022-05-27 ASSESSMENT — FIBROSIS 4 INDEX: FIB4 SCORE: 0.49

## 2022-05-28 LAB
AMPHETAMINES UR QL: NEGATIVE NG/ML
BARBITURATES UR QL: NEGATIVE NG/ML
BENZODIAZ UR QL: NEGATIVE NG/ML
CANNABINOIDS UR QL SCN: NEGATIVE NG/ML
COCAINE UR QL: NEGATIVE NG/ML
DRUG SCREEN COMMENT UR-IMP: NORMAL
MDMA CTO UR SCN-MCNC: NEGATIVE NG/ML
METHADONE UR QL: NEGATIVE NG/ML
OPIATES UR QL: NEGATIVE NG/ML
OXYCODONE CTO UR SCN-MCNC: NEGATIVE NG/ML
PCP UR QL SCN: NEGATIVE NG/ML
PROPOXYPH UR QL: NEGATIVE NG/ML

## 2022-05-29 LAB
# FETUSES US: NORMAL
AFP MOM SERPL: 1.33
AFP SERPL-MCNC: 46 NG/ML
AGE - REPORTED: 31.3 YR
CURRENT SMOKER: NO
FAMILY MEMBER DISEASES HX: NO
GA METHOD: NORMAL
GA: NORMAL WK
IDDM PATIENT QL: NO
INTEGRATED SCN PATIENT-IMP: NORMAL
SPECIMEN DRAWN SERPL: NORMAL

## 2022-06-23 ENCOUNTER — ROUTINE PRENATAL (OUTPATIENT)
Dept: OBGYN | Facility: CLINIC | Age: 32
End: 2022-06-23
Payer: COMMERCIAL

## 2022-06-23 VITALS — DIASTOLIC BLOOD PRESSURE: 70 MMHG | BODY MASS INDEX: 36.66 KG/M2 | SYSTOLIC BLOOD PRESSURE: 125 MMHG | WEIGHT: 194 LBS

## 2022-06-23 DIAGNOSIS — O21.9 VOMITING OR NAUSEA OF PREGNANCY: ICD-10-CM

## 2022-06-23 DIAGNOSIS — Z34.82 ENCOUNTER FOR SUPERVISION OF OTHER NORMAL PREGNANCY IN SECOND TRIMESTER: ICD-10-CM

## 2022-06-23 PROCEDURE — 0502F SUBSEQUENT PRENATAL CARE: CPT | Performed by: OBSTETRICS & GYNECOLOGY

## 2022-06-23 ASSESSMENT — FIBROSIS 4 INDEX: FIB4 SCORE: 0.49

## 2022-06-23 NOTE — PROGRESS NOTES
Pt doing well. Pt is having some cramping/pressure type feeling.  Reports her N/V resolved for 1 week and she was happy but then it has come back this week. She reports her weight is stable, which is confirmed with our findings today. Pt did see [facility] for anatomy scan and f/u CL. Pt reports her CL is ok, will see them again in two weeks. No reports on file, will request records.   -PTL precautions reviewed  -Pt to cont N/V management with meds prn  -Encouraged f/u at [facility] as scheduled  -RTc in 4 weeks  -Will order 3rd tri labs next appt  -Pt agrees with plan.

## 2022-07-07 RX ORDER — PROMETHAZINE HYDROCHLORIDE 25 MG/1
TABLET ORAL
Qty: 30 TABLET | Refills: 2 | Status: ON HOLD | OUTPATIENT
Start: 2022-07-07 | End: 2022-10-27

## 2022-07-18 ENCOUNTER — ROUTINE PRENATAL (OUTPATIENT)
Dept: OBGYN | Facility: CLINIC | Age: 32
End: 2022-07-18
Payer: COMMERCIAL

## 2022-07-18 VITALS — SYSTOLIC BLOOD PRESSURE: 123 MMHG | WEIGHT: 197 LBS | DIASTOLIC BLOOD PRESSURE: 73 MMHG | BODY MASS INDEX: 37.22 KG/M2

## 2022-07-18 DIAGNOSIS — G89.29 CHRONIC NONINTRACTABLE HEADACHE, UNSPECIFIED HEADACHE TYPE: ICD-10-CM

## 2022-07-18 DIAGNOSIS — R51.9 NONINTRACTABLE HEADACHE, UNSPECIFIED CHRONICITY PATTERN, UNSPECIFIED HEADACHE TYPE: ICD-10-CM

## 2022-07-18 DIAGNOSIS — R51.9 CHRONIC NONINTRACTABLE HEADACHE, UNSPECIFIED HEADACHE TYPE: ICD-10-CM

## 2022-07-18 DIAGNOSIS — Z34.82 ENCOUNTER FOR SUPERVISION OF OTHER NORMAL PREGNANCY IN SECOND TRIMESTER: ICD-10-CM

## 2022-07-18 PROCEDURE — 0502F SUBSEQUENT PRENATAL CARE: CPT | Performed by: OBSTETRICS & GYNECOLOGY

## 2022-07-18 ASSESSMENT — FIBROSIS 4 INDEX: FIB4 SCORE: 0.51

## 2022-07-19 PROBLEM — G89.29 CHRONIC NONINTRACTABLE HEADACHE: Status: ACTIVE | Noted: 2022-07-19

## 2022-07-19 PROBLEM — R51.9 CHRONIC NONINTRACTABLE HEADACHE: Status: ACTIVE | Noted: 2022-07-19

## 2022-07-19 NOTE — PROGRESS NOTES
Pt doing well. Reports N/V slughtly improving. Pt has gained 3 lbs. She does report new onset headaches. She reports to having these most days. She reports the headaches starts first then she will notice some blurry vision. She has not taken her BP during this time but has taken them outside of headaches and they have been normal. She reports sensitivity to sounds, no N/V, no sens to light. Denies tingling in hands or feet. Pt reports these headaches have prevented her from driving. She takes tylenol sometimes which helps sometimes. She is trying to drink a lot of water. She reports she has had less sleep recently due to her child and frequent nighttime awakenings.  -3rd tri labs ordered today  -Will add baseline PreE labs  -Headaches sound consistent with migraine HA. Dicussed supportive care for headaches, if they persist will send to headache clinic  -RTC in 3 weeks  -F/u with [facility] as scheduled   -PTL and [facility]KC reivewed

## 2022-08-01 ENCOUNTER — HOSPITAL ENCOUNTER (OUTPATIENT)
Dept: LAB | Facility: MEDICAL CENTER | Age: 32
End: 2022-08-01
Attending: OBSTETRICS & GYNECOLOGY
Payer: COMMERCIAL

## 2022-08-01 ENCOUNTER — ROUTINE PRENATAL (OUTPATIENT)
Dept: OBGYN | Facility: CLINIC | Age: 32
End: 2022-08-01
Payer: COMMERCIAL

## 2022-08-01 VITALS — BODY MASS INDEX: 37.03 KG/M2 | WEIGHT: 196 LBS | SYSTOLIC BLOOD PRESSURE: 133 MMHG | DIASTOLIC BLOOD PRESSURE: 80 MMHG

## 2022-08-01 DIAGNOSIS — Z34.82 ENCOUNTER FOR SUPERVISION OF OTHER NORMAL PREGNANCY IN SECOND TRIMESTER: ICD-10-CM

## 2022-08-01 DIAGNOSIS — R51.9 NONINTRACTABLE HEADACHE, UNSPECIFIED CHRONICITY PATTERN, UNSPECIFIED HEADACHE TYPE: ICD-10-CM

## 2022-08-01 DIAGNOSIS — G89.29 CHRONIC NONINTRACTABLE HEADACHE, UNSPECIFIED HEADACHE TYPE: ICD-10-CM

## 2022-08-01 DIAGNOSIS — O21.9 VOMITING OR NAUSEA OF PREGNANCY: ICD-10-CM

## 2022-08-01 DIAGNOSIS — R51.9 CHRONIC NONINTRACTABLE HEADACHE, UNSPECIFIED HEADACHE TYPE: ICD-10-CM

## 2022-08-01 LAB
ALBUMIN SERPL BCP-MCNC: 3.8 G/DL (ref 3.2–4.9)
ALBUMIN/GLOB SERPL: 1.3 G/DL
ALP SERPL-CCNC: 64 U/L (ref 30–99)
ALT SERPL-CCNC: 16 U/L (ref 2–50)
ANION GAP SERPL CALC-SCNC: 12 MMOL/L (ref 7–16)
AST SERPL-CCNC: 22 U/L (ref 12–45)
BASOPHILS # BLD AUTO: 0.2 % (ref 0–1.8)
BASOPHILS # BLD: 0.02 K/UL (ref 0–0.12)
BILIRUB SERPL-MCNC: 0.2 MG/DL (ref 0.1–1.5)
BUN SERPL-MCNC: 4 MG/DL (ref 8–22)
CALCIUM SERPL-MCNC: 9 MG/DL (ref 8.5–10.5)
CHLORIDE SERPL-SCNC: 105 MMOL/L (ref 96–112)
CO2 SERPL-SCNC: 20 MMOL/L (ref 20–33)
CREAT SERPL-MCNC: 0.54 MG/DL (ref 0.5–1.4)
EOSINOPHIL # BLD AUTO: 0.03 K/UL (ref 0–0.51)
EOSINOPHIL NFR BLD: 0.4 % (ref 0–6.9)
ERYTHROCYTE [DISTWIDTH] IN BLOOD BY AUTOMATED COUNT: 43.8 FL (ref 35.9–50)
GFR SERPLBLD CREATININE-BSD FMLA CKD-EPI: 126 ML/MIN/1.73 M 2
GLOBULIN SER CALC-MCNC: 3 G/DL (ref 1.9–3.5)
GLUCOSE 1H P 50 G GLC PO SERPL-MCNC: 125 MG/DL (ref 70–139)
GLUCOSE SERPL-MCNC: 124 MG/DL (ref 65–99)
HCT VFR BLD AUTO: 37.2 % (ref 37–47)
HGB BLD-MCNC: 12.6 G/DL (ref 12–16)
HIV 1+2 AB+HIV1 P24 AG SERPL QL IA: NORMAL
IMM GRANULOCYTES # BLD AUTO: 0.04 K/UL (ref 0–0.11)
IMM GRANULOCYTES NFR BLD AUTO: 0.5 % (ref 0–0.9)
LDH SERPL L TO P-CCNC: 179 U/L (ref 107–266)
LYMPHOCYTES # BLD AUTO: 1.86 K/UL (ref 1–4.8)
LYMPHOCYTES NFR BLD: 23.2 % (ref 22–41)
MCH RBC QN AUTO: 30.1 PG (ref 27–33)
MCHC RBC AUTO-ENTMCNC: 33.9 G/DL (ref 33.6–35)
MCV RBC AUTO: 88.8 FL (ref 81.4–97.8)
MONOCYTES # BLD AUTO: 0.34 K/UL (ref 0–0.85)
MONOCYTES NFR BLD AUTO: 4.2 % (ref 0–13.4)
NEUTROPHILS # BLD AUTO: 5.72 K/UL (ref 2–7.15)
NEUTROPHILS NFR BLD: 71.5 % (ref 44–72)
NRBC # BLD AUTO: 0 K/UL
NRBC BLD-RTO: 0 /100 WBC
PLATELET # BLD AUTO: 242 K/UL (ref 164–446)
PMV BLD AUTO: 11.4 FL (ref 9–12.9)
POTASSIUM SERPL-SCNC: 4 MMOL/L (ref 3.6–5.5)
PROT SERPL-MCNC: 6.8 G/DL (ref 6–8.2)
RBC # BLD AUTO: 4.19 M/UL (ref 4.2–5.4)
SODIUM SERPL-SCNC: 137 MMOL/L (ref 135–145)
URATE SERPL-MCNC: 5.4 MG/DL (ref 1.9–8.2)
WBC # BLD AUTO: 8 K/UL (ref 4.8–10.8)

## 2022-08-01 PROCEDURE — 85025 COMPLETE CBC W/AUTO DIFF WBC: CPT

## 2022-08-01 PROCEDURE — 80053 COMPREHEN METABOLIC PANEL: CPT

## 2022-08-01 PROCEDURE — 82950 GLUCOSE TEST: CPT

## 2022-08-01 PROCEDURE — 0502F SUBSEQUENT PRENATAL CARE: CPT | Performed by: OBSTETRICS & GYNECOLOGY

## 2022-08-01 PROCEDURE — 84550 ASSAY OF BLOOD/URIC ACID: CPT

## 2022-08-01 PROCEDURE — 36415 COLL VENOUS BLD VENIPUNCTURE: CPT

## 2022-08-01 PROCEDURE — 87389 HIV-1 AG W/HIV-1&-2 AB AG IA: CPT

## 2022-08-01 PROCEDURE — 83615 LACTATE (LD) (LDH) ENZYME: CPT

## 2022-08-01 ASSESSMENT — FIBROSIS 4 INDEX: FIB4 SCORE: 0.51

## 2022-08-01 NOTE — PROGRESS NOTES
Pt doing well. Pt reports cont HA. Less visual symptoms. She denies any elevated Bps at home. [facility] N/V still present. Pt si able to keep down food.  -Discussed use of magnsium oxide for HA, start with 100 mg and work up to 400-600 mg as tolerated. Pt to discuss these with MFM as well. PreE baseline labs ordered. Pt completed today  -3rd tri labs completed today, no results yet.   -PTL and FKC reviewed  -RTC in 3 weeks  -Tdap next appt

## 2022-08-22 ENCOUNTER — ROUTINE PRENATAL (OUTPATIENT)
Dept: OBGYN | Facility: CLINIC | Age: 32
End: 2022-08-22
Payer: COMMERCIAL

## 2022-08-22 VITALS — WEIGHT: 198 LBS | BODY MASS INDEX: 37.41 KG/M2

## 2022-08-22 DIAGNOSIS — G89.29 CHRONIC NONINTRACTABLE HEADACHE, UNSPECIFIED HEADACHE TYPE: ICD-10-CM

## 2022-08-22 DIAGNOSIS — Z34.83 ENCOUNTER FOR SUPERVISION OF OTHER NORMAL PREGNANCY IN THIRD TRIMESTER: ICD-10-CM

## 2022-08-22 DIAGNOSIS — R51.9 CHRONIC NONINTRACTABLE HEADACHE, UNSPECIFIED HEADACHE TYPE: ICD-10-CM

## 2022-08-22 PROBLEM — Z34.93 SUPERVISION OF NORMAL PREGNANCY IN THIRD TRIMESTER: Status: ACTIVE | Noted: 2019-12-03

## 2022-08-22 PROBLEM — Z34.92 SUPERVISION OF NORMAL PREGNANCY IN SECOND TRIMESTER: Status: RESOLVED | Noted: 2019-12-03 | Resolved: 2022-08-22

## 2022-08-22 PROBLEM — Z34.93 SUPERVISION OF NORMAL PREGNANCY IN THIRD TRIMESTER: Status: RESOLVED | Noted: 2019-12-03 | Resolved: 2022-08-22

## 2022-08-22 PROCEDURE — 90471 IMMUNIZATION ADMIN: CPT | Performed by: OBSTETRICS & GYNECOLOGY

## 2022-08-22 PROCEDURE — 0502F SUBSEQUENT PRENATAL CARE: CPT | Performed by: OBSTETRICS & GYNECOLOGY

## 2022-08-22 PROCEDURE — 90715 TDAP VACCINE 7 YRS/> IM: CPT | Performed by: OBSTETRICS & GYNECOLOGY

## 2022-08-22 RX ORDER — PROGESTERONE 200 MG/1
CAPSULE ORAL
Status: ON HOLD | COMMUNITY
Start: 2022-07-05 | End: 2022-10-27

## 2022-08-22 ASSESSMENT — FIBROSIS 4 INDEX: FIB4 SCORE: .7045454545454545455

## 2022-08-22 NOTE — LETTER
"Count Your Baby's Movements  Another step to a healthy delivery  Kadi Dykes             Dept: [phone]    How Many Weeks Pregnant? 29 weeks 6 days     Date to Begin Counting: Today until delivery               How to use this chart    One way for your physician to keep track of your baby's health is by knowing how often the baby moves (or \"kicks\") in your womb.  You can help your physician to do this by using this chart every day.    Every day, you should see how many hours it takes for your baby to move 10 times.  Start in the morning, as soon as you get up.    · First, write down the time your baby moves until you get to 10.  · Check off one box every time your baby moves until you get to 10.  · Write down the time you finished counting in the last column.  · Total how long it took to count up all 10 movements.  · Finally, fill in the box that shows how long this took.  After counting 10 movements, you no longer have to count any more that day.  The next morning, just start counting again as soon as you get up.    What should you call a \"movement\"?  It is hard to say, because it will feel different from one mother to another and from one pregnancy to the next.  The important thing is that you count the movements the same way throughout your pregnancy.  If you have more questions, you should ask your physician.    Count carefully every day!  SAMPLE:  Week 28    How many hours did it take to feel 10 movements?       Start  Time     1     2     3     4     5     6     7     8     9     10   Finish Time   Mon 8:20 ·  ·  ·  ·  ·  ·  ·  ·  ·  ·  11:40   Tue Wed Thu Fri               Sat               Sun                 IMPORTANT: You should contact your physician if it takes more than two hours for you to feel 10 movements.  Each morning, write down the time and start to count the movements of your baby.  Keep track by checking off one box every time you feel one " "movement.  When you have felt 10 \"kicks\", write down the time you finished counting in the last column.  Then fill in the   box (over the check chelita) for the number of hours it took.  Be sure to read the complete instructions on the previous page.            "

## 2022-08-22 NOTE — PROGRESS NOTES
Patient received TDAP today     NDC: 44481-370-77  LOT#: [id number]  Expiration Date: 2024    Dose: 0.5 ML  Site: Left Deltoid    Patient educated on use and side effects of medication. Name and  verified prior to injection. Pt tolerated? WELL     Verified by [facility]    Administered by Griselda Padilla, [facility] at 12:59 PM.    Patient Provided Medication: no

## 2022-08-22 NOTE — PROGRESS NOTES
Doing ok. Having increasing HA. Will refer to HA clinic. Pt also report increased nausea. Pt has gained 4# since her last appt. Pt having a few irregular, not painful ctx.   -PTL and FKC reviewed  -RTC in 3 weeks  -Tdap today  -Blood type A+  -referral to HA clinic placed

## 2022-09-13 ENCOUNTER — ROUTINE PRENATAL (OUTPATIENT)
Dept: OBGYN | Facility: CLINIC | Age: 32
End: 2022-09-13
Payer: COMMERCIAL

## 2022-09-13 VITALS
HEIGHT: 61 IN | DIASTOLIC BLOOD PRESSURE: 86 MMHG | BODY MASS INDEX: 37.49 KG/M2 | WEIGHT: 198.6 LBS | SYSTOLIC BLOOD PRESSURE: 124 MMHG

## 2022-09-13 DIAGNOSIS — Z34.83 PRENATAL CARE, SUBSEQUENT PREGNANCY IN THIRD TRIMESTER: Primary | ICD-10-CM

## 2022-09-13 DIAGNOSIS — Z34.83 ENCOUNTER FOR SUPERVISION OF OTHER NORMAL PREGNANCY IN THIRD TRIMESTER: ICD-10-CM

## 2022-09-13 PROCEDURE — 0502F SUBSEQUENT PRENATAL CARE: CPT | Performed by: OBSTETRICS & GYNECOLOGY

## 2022-09-13 ASSESSMENT — FIBROSIS 4 INDEX: FIB4 SCORE: .7045454545454545455

## 2022-09-29 ENCOUNTER — TELEPHONE (OUTPATIENT)
Dept: OBGYN | Facility: CLINIC | Age: 32
End: 2022-09-29

## 2022-09-29 ENCOUNTER — ROUTINE PRENATAL (OUTPATIENT)
Dept: OBGYN | Facility: CLINIC | Age: 32
End: 2022-09-29
Payer: COMMERCIAL

## 2022-09-29 VITALS — DIASTOLIC BLOOD PRESSURE: 83 MMHG | WEIGHT: 201.2 LBS | BODY MASS INDEX: 38.02 KG/M2 | SYSTOLIC BLOOD PRESSURE: 122 MMHG

## 2022-09-29 DIAGNOSIS — Z34.83 ENCOUNTER FOR SUPERVISION OF OTHER NORMAL PREGNANCY IN THIRD TRIMESTER: Primary | ICD-10-CM

## 2022-09-29 DIAGNOSIS — O99.213 OBESITY AFFECTING PREGNANCY IN THIRD TRIMESTER: ICD-10-CM

## 2022-09-29 PROBLEM — O99.210 OBESITY AFFECTING PREGNANCY: Status: ACTIVE | Noted: 2022-09-29

## 2022-09-29 PROCEDURE — 0502F SUBSEQUENT PRENATAL CARE: CPT | Performed by: OBSTETRICS & GYNECOLOGY

## 2022-09-29 ASSESSMENT — FIBROSIS 4 INDEX: FIB4 SCORE: .7045454545454545455

## 2022-10-12 ENCOUNTER — HOSPITAL ENCOUNTER (EMERGENCY)
Facility: MEDICAL CENTER | Age: 32
End: 2022-10-12
Attending: OBSTETRICS & GYNECOLOGY | Admitting: OBSTETRICS & GYNECOLOGY
Payer: COMMERCIAL

## 2022-10-12 VITALS
OXYGEN SATURATION: 98 % | HEIGHT: 62 IN | HEART RATE: 82 BPM | DIASTOLIC BLOOD PRESSURE: 83 MMHG | RESPIRATION RATE: 20 BRPM | BODY MASS INDEX: 36.62 KG/M2 | WEIGHT: 199 LBS | TEMPERATURE: 96.5 F | SYSTOLIC BLOOD PRESSURE: 122 MMHG

## 2022-10-12 LAB
APPEARANCE UR: CLEAR
COLOR UR AUTO: YELLOW
GLUCOSE UR QL STRIP.AUTO: NEGATIVE MG/DL
KETONES UR QL STRIP.AUTO: NEGATIVE MG/DL
LEUKOCYTE ESTERASE UR QL STRIP.AUTO: ABNORMAL
NITRITE UR QL STRIP.AUTO: NEGATIVE
PH UR STRIP.AUTO: 6.5 [PH] (ref 5–8)
PROT UR QL STRIP: NEGATIVE MG/DL
RBC UR QL AUTO: NEGATIVE
SP GR UR STRIP.AUTO: 1.01 (ref 1–1.03)

## 2022-10-12 PROCEDURE — 87150 DNA/RNA AMPLIFIED PROBE: CPT

## 2022-10-12 PROCEDURE — 81002 URINALYSIS NONAUTO W/O SCOPE: CPT

## 2022-10-12 PROCEDURE — 87081 CULTURE SCREEN ONLY: CPT

## 2022-10-12 PROCEDURE — 99282 EMERGENCY DEPT VISIT SF MDM: CPT | Mod: 25 | Performed by: OBSTETRICS & GYNECOLOGY

## 2022-10-12 PROCEDURE — 59025 FETAL NON-STRESS TEST: CPT | Mod: 26 | Performed by: OBSTETRICS & GYNECOLOGY

## 2022-10-12 PROCEDURE — 99282 EMERGENCY DEPT VISIT SF MDM: CPT

## 2022-10-12 ASSESSMENT — FIBROSIS 4 INDEX: FIB4 SCORE: .7045454545454545455

## 2022-10-12 NOTE — PROGRESS NOTES
1320:A  ambulated to labor and delivery w/ c/o N/V, dizziness, and SOB x 1100 this morning. Pt reports positive fetal movement; Pt denies LOF; RUCs; or VB.  1430: Dr. Condon called and updated on pt status; MD to come assess pt.  1450: Dr. Naidu at bedside; SVE /-2; GBS swab collected; pt report nausea and dizziness subsided; orders received to discharge pt if symptoms do not return.  1550: Discharge paperwork reviewed w/ pt; pt verbalizes understanding; pt ambulated off labor and delivery unit in stable condition w/ FOB at side.

## 2022-10-12 NOTE — ED PROVIDER NOTES
OB ED NOTE      Gisela Dykes is a 32 y.o. year old female  at 37w1d who presents for nausea and vomiting.  The patient states she was feeling well until approximately 11 AM today, when she was having multiple episodes of vomiting.  She states she felt little dizzy, and shortness of breath, while she was vomiting, but this has improved.  She has suffered from nausea and vomiting her entire pregnancy, and takes Diclegis, B6, and Unisom.  Currently, she is feeling better.  She is feeling occasional contractions.  Denies leaking of fluid or vaginal bleeding.  The fetus is active.    Denies fevers, chills, diarrhea, abdominal pain, sick contacts.  She reports that she is been somewhat isolating, due to her pregnant sister-in-law recently being diagnosed with COVID.      ROS: Pertinent items are noted in HPI.    Past Medical History:   Diagnosis Date    Patient denies medical problems      Past Surgical History:   Procedure Laterality Date    ABDOMINAL EXPLORATION  2021    gallbladder removed      OB History    Para Term  AB Living   4 2 2   1 2   SAB IAB Ectopic Molar Multiple Live Births   1         2      # Outcome Date GA Lbr Osvaldo/2nd Weight Sex Delivery Anes PTL Lv   4 Current            3 SAB  9w0d          2 Term 20 40w0d  3.118 kg (6 lb 14 oz) F Vag-Spont EPI  SOLE   1 Term 18 40w0d  2.977 kg (6 lb 9 oz) M Vag-Spont EPI N SOLE     Allergies: Hydrocodone-acetaminophen  No current facility-administered medications for this encounter.    Prenatal care with [facility] starting at 14 weels with following problems:  Patient Active Problem List    Diagnosis Date Noted    Obesity affecting pregnancy 2022    Chronic nonintractable headache 2022    Vomiting or nausea of pregnancy 2022    Supervision of normal pregnancy in third trimester 2019       NST INTERPRETATION - PER MY READ    INDICATIONS: nausea and vomiting in pregnany  UTERINE ACTIVITY:  "irregular  BASELINE: 130  VARIABILITY: moderate  ACCELERATIONS: present  DECELERATIONS: absent  CATEGORY 1 TRACING      Objective:      /83   Pulse 82   Temp 35.8 °C (96.5 °F) (Temporal)   Resp 20   Ht 1.575 m (5' 2\")   Wt 90.3 kg (199 lb)   SpO2 98%     GENERAL: Alert, in no apparent distress  PSYCHIATRIC: Appropriate affect, intact insight and judgement.  NECK:  Nontender, no masses.  No Thyromegaly or nodules. No lymphadenopathy.  RESPIRATORY: Normal respiratory effort.  Lungs clear to auscultation.   CARDIOVASCULAR: RRR, no murmur, gallop, or rub.  ABDOMEN: Soft, gravid, NT  BACK: No CVA tenderness  EXTREMITIES: No edema, calves NT  SKIN: No rash    SVE - 1 cm/80%/-2/soft/midplane    Lab Review  Lab:   Blood type: A     Recent Results (from the past 5880 hour(s))   POCT Urinalysis    Collection Time: 04/02/22  1:08 PM   Result Value Ref Range    POC Color Yellow Negative    POC Appearance Slighty Cloudy Negative    POC Leukocyte Esterase Small Negative    POC Nitrites Negative Negative    POC Urobiligen 0.2 Negative (0.2) mg/dL    POC Protein Negative Negative mg/dL    POC Urine PH 7.5 5.0 - 8.0    POC Blood Negative Negative    POC Specific Gravity 1.015 <1.005 - >1.030    POC Ketones Negative Negative mg/dL    POC Bilirubin Negative Negative mg/dL    POC Glucose Negative Negative mg/dL   URINE CULTURE(NEW)    Collection Time: 04/02/22  1:10 PM    Specimen: Urine   Result Value Ref Range    Significant Indicator NEG     Source UR     Site -     Culture Result Usual skin sabi ,000 cfu/mL    POCT Pregnancy    Collection Time: 04/02/22  1:14 PM   Result Value Ref Range    POC Urine Pregnancy Test Positive Negative    Internal Control Positive Valid     Internal Control Negative Valid    CBC WITH DIFFERENTIAL    Collection Time: 04/06/22  3:21 PM   Result Value Ref Range    WBC 9.2 4.8 - 10.8 K/uL    RBC 5.40 4.20 - 5.40 M/uL    Hemoglobin 15.7 12.0 - 16.0 g/dL    Hematocrit 44.8 37.0 - 47.0 %    " MCV 83.0 81.4 - 97.8 fL    MCH 29.1 27.0 - 33.0 pg    MCHC 35.0 33.6 - 35.0 g/dL    RDW 38.8 35.9 - 50.0 fL    Platelet Count 286 164 - 446 K/uL    MPV 10.5 9.0 - 12.9 fL    Neutrophils-Polys 68.40 44.00 - 72.00 %    Lymphocytes 25.20 22.00 - 41.00 %    Monocytes 5.50 0.00 - 13.40 %    Eosinophils 0.40 0.00 - 6.90 %    Basophils 0.30 0.00 - 1.80 %    Immature Granulocytes 0.20 0.00 - 0.90 %    Nucleated RBC 0.00 /100 WBC    Neutrophils (Absolute) 6.30 2.00 - 7.15 K/uL    Lymphs (Absolute) 2.33 1.00 - 4.80 K/uL    Monos (Absolute) 0.51 0.00 - 0.85 K/uL    Eos (Absolute) 0.04 0.00 - 0.51 K/uL    Baso (Absolute) 0.03 0.00 - 0.12 K/uL    Immature Granulocytes (abs) 0.02 0.00 - 0.11 K/uL    NRBC (Absolute) 0.00 K/uL   COMP METABOLIC PANEL    Collection Time: 04/06/22  3:21 PM   Result Value Ref Range    Sodium 136 135 - 145 mmol/L    Potassium 4.2 3.6 - 5.5 mmol/L    Chloride 102 96 - 112 mmol/L    Co2 20 20 - 33 mmol/L    Anion Gap 14.0 7.0 - 16.0    Glucose 91 65 - 99 mg/dL    Bun 7 (L) 8 - 22 mg/dL    Creatinine 0.62 0.50 - 1.40 mg/dL    Calcium 9.7 8.5 - 10.5 mg/dL    AST(SGOT) 18 12 - 45 U/L    ALT(SGPT) 20 2 - 50 U/L    Alkaline Phosphatase 62 30 - 99 U/L    Total Bilirubin 0.6 0.1 - 1.5 mg/dL    Albumin 4.5 3.2 - 4.9 g/dL    Total Protein 7.8 6.0 - 8.2 g/dL    Globulin 3.3 1.9 - 3.5 g/dL    A-G Ratio 1.4 g/dL   LIPASE    Collection Time: 04/06/22  3:21 PM   Result Value Ref Range    Lipase 21 11 - 82 U/L   HCG QUANTITATIVE    Collection Time: 04/06/22  3:21 PM   Result Value Ref Range    Bhcg 749482.0 (H) 0.0 - 5.0 mIU/mL   ESTIMATED GFR    Collection Time: 04/06/22  3:21 PM   Result Value Ref Range    GFR (CKD-EPI) 122 >60 mL/min/1.73 m 2   RH TYPE FOR RHOGAM FROM E.D.    Collection Time: 04/06/22  3:21 PM   Result Value Ref Range    Emergency Department Rh Typing POS     Number Of Rh Doses Indicated ZERO    URINALYSIS,CULTURE IF INDICATED    Collection Time: 04/06/22  4:10 PM    Specimen: Urine   Result Value  Ref Range    Color Yellow     Character Cloudy (A)     Specific Gravity 1.022 <1.035    Ph 5.0 5.0 - 8.0    Glucose Negative Negative mg/dL    Ketones >=160 Negative mg/dL    Protein Negative Negative mg/dL    Bilirubin Negative Negative    Urobilinogen, Urine 1.0 Negative    Nitrite Negative Negative    Leukocyte Esterase Small (A) Negative    Occult Blood Negative Negative    Micro Urine Req Microscopic    URINE MICROSCOPIC (W/UA)    Collection Time: 04/06/22  4:10 PM   Result Value Ref Range    WBC 10-20 (A) /hpf    RBC 2-5 (A) /hpf    Bacteria Few (A) None /hpf    Epithelial Cells Few /hpf    Hyaline Cast 0-2 /lpf   URINE CULTURE(NEW)    Collection Time: 04/06/22  4:10 PM    Specimen: Urine   Result Value Ref Range    Significant Indicator NEG     Source UR     Site -     Culture Result Usual urogenital sabi ,000 cfu/mL    POCT Influenza A/B    Collection Time: 04/18/22  1:49 PM   Result Value Ref Range    Rapid Influenza A-B Negative     Internal Control Positive Valid     Internal Control Negative Valid    POCT Pregnancy    Collection Time: 04/20/22  9:46 AM   Result Value Ref Range    POC Urine Pregnancy Test Positive Negative    Internal Control Positive Valid     Internal Control Negative Valid    CBC WITH DIFFERENTIAL    Collection Time: 04/21/22  1:55 PM   Result Value Ref Range    WBC 8.5 4.8 - 10.8 K/uL    RBC 5.51 (H) 4.20 - 5.40 M/uL    Hemoglobin 15.7 12.0 - 16.0 g/dL    Hematocrit 44.4 37.0 - 47.0 %    MCV 80.6 (L) 81.4 - 97.8 fL    MCH 28.5 27.0 - 33.0 pg    MCHC 35.4 (H) 33.6 - 35.0 g/dL    RDW 37.6 35.9 - 50.0 fL    Platelet Count 250 164 - 446 K/uL    MPV 10.3 9.0 - 12.9 fL    Neutrophils-Polys 71.30 44.00 - 72.00 %    Lymphocytes 23.40 22.00 - 41.00 %    Monocytes 4.40 0.00 - 13.40 %    Eosinophils 0.50 0.00 - 6.90 %    Basophils 0.20 0.00 - 1.80 %    Immature Granulocytes 0.20 0.00 - 0.90 %    Nucleated RBC 0.00 /100 WBC    Neutrophils (Absolute) 6.04 2.00 - 7.15 K/uL    Lymphs  (Absolute) 1.98 1.00 - 4.80 K/uL    Monos (Absolute) 0.37 0.00 - 0.85 K/uL    Eos (Absolute) 0.04 0.00 - 0.51 K/uL    Baso (Absolute) 0.02 0.00 - 0.12 K/uL    Immature Granulocytes (abs) 0.02 0.00 - 0.11 K/uL    NRBC (Absolute) 0.00 K/uL   COMP METABOLIC PANEL    Collection Time: 04/21/22  1:55 PM   Result Value Ref Range    Sodium 138 135 - 145 mmol/L    Potassium 3.6 3.6 - 5.5 mmol/L    Chloride 102 96 - 112 mmol/L    Co2 21 20 - 33 mmol/L    Anion Gap 15.0 7.0 - 16.0    Glucose 98 65 - 99 mg/dL    Bun 8 8 - 22 mg/dL    Creatinine 0.53 0.50 - 1.40 mg/dL    Calcium 9.5 8.5 - 10.5 mg/dL    AST(SGOT) 26 12 - 45 U/L    ALT(SGPT) 33 2 - 50 U/L    Alkaline Phosphatase 65 30 - 99 U/L    Total Bilirubin 0.8 0.1 - 1.5 mg/dL    Albumin 4.4 3.2 - 4.9 g/dL    Total Protein 7.8 6.0 - 8.2 g/dL    Globulin 3.4 1.9 - 3.5 g/dL    A-G Ratio 1.3 g/dL   LIPASE    Collection Time: 04/21/22  1:55 PM   Result Value Ref Range    Lipase 22 11 - 82 U/L   HCG QUANTITATIVE    Collection Time: 04/21/22  1:55 PM   Result Value Ref Range    Bhcg 03051.0 (H) 0.0 - 5.0 mIU/mL   ESTIMATED GFR    Collection Time: 04/21/22  1:55 PM   Result Value Ref Range    GFR (CKD-EPI) 127 >60 mL/min/1.73 m 2   URINALYSIS,CULTURE IF INDICATED    Collection Time: 04/21/22  5:35 PM    Specimen: Urine   Result Value Ref Range    Color DK Yellow     Character Cloudy (A)     Specific Gravity 1.026 <1.035    Ph 5.5 5.0 - 8.0    Glucose Negative Negative mg/dL    Ketones >=160 Negative mg/dL    Protein 30 (A) Negative mg/dL    Bilirubin Negative Negative    Urobilinogen, Urine 1.0 Negative    Nitrite Negative Negative    Leukocyte Esterase Small (A) Negative    Occult Blood Negative Negative    Micro Urine Req Microscopic    URINE MICROSCOPIC (W/UA)    Collection Time: 04/21/22  5:35 PM   Result Value Ref Range    WBC 5-10 (A) /hpf    RBC 5-10 (A) /hpf    Bacteria Few (A) None /hpf    Epithelial Cells Moderate (A) /hpf    Hyaline Cast 6-10 (A) /lpf   VAGINAL PATHOGENS  DNA PANEL    Collection Time: 05/04/22 12:30 AM   Result Value Ref Range    Candida species DNA Probe Negative Negative    Trichamonas vaginalis DNA Probe Negative Negative    Gardnerella vaginalis DNA Probe Negative Negative   THINPREP PAP W/HPV AND CTNG    Collection Time: 05/04/22 12:30 AM   Result Value Ref Range    Cytology Reg See Path Report     Source Cervical     HPV Genotype 16 Negative Negative    HPV Genotype 18 Negative Negative    HPV Other High Risk Genotypes Negative Negative    C. trachomatis by PCR Negative Negative    N. gonorrhoeae by PCR Negative Negative    Source Cervical    AMBIGUOUS DATE/TIME    Collection Time: 05/05/22  6:30 AM    Specimen: Genital   Result Value Ref Range    Significant Indicator .     Source GEN     Site -     Ambiguous Date/Time       This specimen was received from your office without a  collection date and/or time. Collection date and/or time  defaulted to receipt date and/or time.     AMBIGUOUS DATE/TIME    Collection Time: 05/05/22  9:41 AM   Result Value Ref Range    Ambiguous Date/Time See Below:    Chlamydia/GC PCR (Urine)    Collection Time: 05/26/22 10:13 AM    Specimen: Genital   Result Value Ref Range    C. trachomatis by PCR Negative Negative    N. gonorrhoeae by PCR Negative Negative    Source Urine    PRENATAL PANEL 3+HIV+HCV    Collection Time: 05/26/22 10:13 AM   Result Value Ref Range    WBC 8.7 4.8 - 10.8 K/uL    RBC 4.55 4.20 - 5.40 M/uL    Hemoglobin 13.3 12.0 - 16.0 g/dL    Hematocrit 38.4 37.0 - 47.0 %    MCV 84.4 81.4 - 97.8 fL    MCH 29.2 27.0 - 33.0 pg    MCHC 34.6 33.6 - 35.0 g/dL    RDW 43.0 35.9 - 50.0 fL    Platelet Count 276 164 - 446 K/uL    MPV 11.2 9.0 - 12.9 fL    Neutrophils-Polys 71.30 44.00 - 72.00 %    Lymphocytes 22.20 22.00 - 41.00 %    Monocytes 5.20 0.00 - 13.40 %    Eosinophils 0.60 0.00 - 6.90 %    Basophils 0.20 0.00 - 1.80 %    Immature Granulocytes 0.50 0.00 - 0.90 %    Nucleated RBC 0.00 /100 WBC    Neutrophils (Absolute)  6.17 2.00 - 7.15 K/uL    Lymphs (Absolute) 1.92 1.00 - 4.80 K/uL    Monos (Absolute) 0.45 0.00 - 0.85 K/uL    Eos (Absolute) 0.05 0.00 - 0.51 K/uL    Baso (Absolute) 0.02 0.00 - 0.12 K/uL    Immature Granulocytes (abs) 0.04 0.00 - 0.11 K/uL    NRBC (Absolute) 0.00 K/uL    Rubella IgG Antibody 58.90 IU/mL    Hepatitis B Surface Antigen Non-Reactive Non-Reactive    Hepatitis C Antibody Non-Reactive Non-Reactive    Syphilis, Treponemal Qual Non-Reactive Non-Reactive   HIV AG/AB COMBO ASSAY SCREENING    Collection Time: 05/26/22 10:13 AM   Result Value Ref Range    HIV Ag/Ab Combo Assay Non-Reactive Non Reactive   OP Prenatal Panel-Blood Bank    Collection Time: 05/26/22 10:13 AM   Result Value Ref Range    ABO Grouping Only A     Rh Grouping Only POS     Antibody Screen Scrn NEG    URINE DRUG SCREEN W/O CONF (AR)    Collection Time: 05/26/22 10:13 AM   Result Value Ref Range    Urine Amphetamine-Methamphetam Negative Cutoff 300 ng/mL    Barbiturates Negative Cutoff 200 ng/mL    Benzodiazepines Negative Cutoff 200 ng/mL    Propoxyphene Negative Cutoff 300 ng/mL    Cocaine Metabolite Negative Cutoff 150 ng/mL    Methadone Negative Cutoff 150 ng/mL    Codeine-Morphine Negative Cutoff 300 ng/mL    Phencyclidine -Pcp Negative Cutoff 25 ng/mL    Cannabinoid Metab Negative Cutoff 20 ng/mL    Oxycodone Negative Cutoff 100 ng/mL    MDMA (Ecstasy) Negative Cutoff 500 ng/mL    Drug Comment Urine Drugs See Note    AFP MATERNAL SERUM ALPHA-FETOPROTEIN    Collection Time: 05/26/22 10:15 AM   Result Value Ref Range    AFP Value -Eia 46 ng/mL    AFP MOM Value 1.33     Interpretation Screen Neg     [age]OTHY [age] yr    Maternal Weight 191.0 lbs.     Gest. Age on Collection Date 17 wks, 2 days     Gestational Age Based On Ultrasound     Multiple Pregnancy Pillai     Race Nonblack     Insulin Dependent Diabetes No     Smoking No     Family Hx NTD No     Specimen See Note    CBC WITH DIFFERENTIAL    Collection Time: 08/01/22  10:26 AM   Result Value Ref Range    WBC 8.0 4.8 - 10.8 K/uL    RBC 4.19 (L) 4.20 - 5.40 M/uL    Hemoglobin 12.6 12.0 - 16.0 g/dL    Hematocrit 37.2 37.0 - 47.0 %    MCV 88.8 81.4 - 97.8 fL    MCH 30.1 27.0 - 33.0 pg    MCHC 33.9 33.6 - 35.0 g/dL    RDW 43.8 35.9 - 50.0 fL    Platelet Count 242 164 - 446 K/uL    MPV 11.4 9.0 - 12.9 fL    Neutrophils-Polys 71.50 44.00 - 72.00 %    Lymphocytes 23.20 22.00 - 41.00 %    Monocytes 4.20 0.00 - 13.40 %    Eosinophils 0.40 0.00 - 6.90 %    Basophils 0.20 0.00 - 1.80 %    Immature Granulocytes 0.50 0.00 - 0.90 %    Nucleated RBC 0.00 /100 WBC    Neutrophils (Absolute) 5.72 2.00 - 7.15 K/uL    Lymphs (Absolute) 1.86 1.00 - 4.80 K/uL    Monos (Absolute) 0.34 0.00 - 0.85 K/uL    Eos (Absolute) 0.03 0.00 - 0.51 K/uL    Baso (Absolute) 0.02 0.00 - 0.12 K/uL    Immature Granulocytes (abs) 0.04 0.00 - 0.11 K/uL    NRBC (Absolute) 0.00 K/uL   GLUCOSE 1HR GESTATIONAL    Collection Time: 08/01/22 10:26 AM   Result Value Ref Range    Glucose, Post Dose 125 70 - 139 mg/dL   HIV AG/AB COMBO ASSAY SCREENING    Collection Time: 08/01/22 10:26 AM   Result Value Ref Range    HIV Ag/Ab Combo Assay Non-Reactive Non Reactive   Comp Metabolic Panel    Collection Time: 08/01/22 10:26 AM   Result Value Ref Range    Sodium 137 135 - 145 mmol/L    Potassium 4.0 3.6 - 5.5 mmol/L    Chloride 105 96 - 112 mmol/L    Co2 20 20 - 33 mmol/L    Anion Gap 12.0 7.0 - 16.0    Glucose 124 (H) 65 - 99 mg/dL    Bun 4 (L) 8 - 22 mg/dL    Creatinine 0.54 0.50 - 1.40 mg/dL    Calcium 9.0 8.5 - 10.5 mg/dL    AST(SGOT) 22 12 - 45 U/L    ALT(SGPT) 16 2 - 50 U/L    Alkaline Phosphatase 64 30 - 99 U/L    Total Bilirubin 0.2 0.1 - 1.5 mg/dL    Albumin 3.8 3.2 - 4.9 g/dL    Total Protein 6.8 6.0 - 8.2 g/dL    Globulin 3.0 1.9 - 3.5 g/dL    A-G Ratio 1.3 g/dL   LDH    Collection Time: 08/01/22 10:26 AM   Result Value Ref Range    LDH Total 179 107 - 266 U/L   URIC ACID    Collection Time: 08/01/22 10:26 AM   Result Value  Ref Range    Uric Acid 5.4 1.9 - 8.2 mg/dL   ESTIMATED GFR    Collection Time: 08/01/22 10:26 AM   Result Value Ref Range    GFR (CKD-EPI) 126 >60 mL/min/1.73 m 2   POCT urinalysis device results    Collection Time: 10/12/22  2:03 PM   Result Value Ref Range    POC Color Yellow     POC Appearance Clear     POC Glucose Negative Negative mg/dL    POC Ketones Negative Negative mg/dL    POC Specific Gravity 1.010 1.005 - 1.030    POC Blood Negative Negative    POC Urine PH 6.5 5.0 - 8.0    POC Protein Negative Negative mg/dL    POC Nitrites Negative Negative    POC Leukocyte Esterase Small (A) Negative        Assessment:   Gisela Dykes at 37w1d presents with nausea and vomiting, dizziness, shortness of breath, all of which have improved.  The patient appears well-hydrated.  Pulmonary exam is within normal limits.  No evidence of hypoxia or respiratory distress.    The patient was offered p.o. hydration versus IV hydration with antiemetic.  She states she is feeling better.  We will allow her a trial of p.o. intake, and if no further vomiting, discharge to home.    Obstetrical history significant for   Patient Active Problem List    Diagnosis Date Noted    Obesity affecting pregnancy 09/29/2022    Chronic nonintractable headache 07/19/2022    Vomiting or nausea of pregnancy 04/20/2022    Supervision of normal pregnancy in third trimester 12/03/2019   .      Plan:     GBS obtained (as patient missed office appointment today)  Labor/SROM precautions reviewed.    Discharge to home.  F/U in office one week.

## 2022-10-13 LAB — GP B STREP DNA SPEC QL NAA+PROBE: NEGATIVE

## 2022-10-18 ENCOUNTER — ROUTINE PRENATAL (OUTPATIENT)
Dept: OBGYN | Facility: CLINIC | Age: 32
End: 2022-10-18
Payer: COMMERCIAL

## 2022-10-18 VITALS — DIASTOLIC BLOOD PRESSURE: 84 MMHG | BODY MASS INDEX: 36.76 KG/M2 | WEIGHT: 201 LBS | SYSTOLIC BLOOD PRESSURE: 143 MMHG

## 2022-10-18 DIAGNOSIS — O99.213 OBESITY AFFECTING PREGNANCY IN THIRD TRIMESTER: ICD-10-CM

## 2022-10-18 PROCEDURE — 90471 IMMUNIZATION ADMIN: CPT | Performed by: OBSTETRICS & GYNECOLOGY

## 2022-10-18 PROCEDURE — 90686 IIV4 VACC NO PRSV 0.5 ML IM: CPT | Performed by: OBSTETRICS & GYNECOLOGY

## 2022-10-18 PROCEDURE — 0502F SUBSEQUENT PRENATAL CARE: CPT | Performed by: OBSTETRICS & GYNECOLOGY

## 2022-10-18 ASSESSMENT — FIBROSIS 4 INDEX: FIB4 SCORE: .7045454545454545455

## 2022-10-18 NOTE — PROGRESS NOTES
Pt here today for OB follow up  Pt states she would like to discuss an induction  Reports +FM  Good # [phone]  Pharmacy Confirmed.  Chaperone offered and not indicated.   GBS negative   Pt would like Flu vaccine, consent signed   Flu vaccine given. LEFT Deltoid. VIS given and screening check list reviewed with pt.

## 2022-10-25 ENCOUNTER — HOSPITAL ENCOUNTER (INPATIENT)
Facility: MEDICAL CENTER | Age: 32
LOS: 3 days | End: 2022-10-28
Attending: OBSTETRICS & GYNECOLOGY | Admitting: OBSTETRICS & GYNECOLOGY
Payer: COMMERCIAL

## 2022-10-25 ENCOUNTER — APPOINTMENT (OUTPATIENT)
Dept: OBGYN | Facility: CLINIC | Age: 32
End: 2022-10-25
Payer: COMMERCIAL

## 2022-10-25 ENCOUNTER — ANESTHESIA EVENT (OUTPATIENT)
Dept: ANESTHESIOLOGY | Facility: MEDICAL CENTER | Age: 32
End: 2022-10-25
Payer: COMMERCIAL

## 2022-10-25 ENCOUNTER — ANESTHESIA (OUTPATIENT)
Dept: ANESTHESIOLOGY | Facility: MEDICAL CENTER | Age: 32
End: 2022-10-25
Payer: COMMERCIAL

## 2022-10-25 ENCOUNTER — APPOINTMENT (OUTPATIENT)
Dept: OBGYN | Facility: MEDICAL CENTER | Age: 32
End: 2022-10-25
Attending: OBSTETRICS & GYNECOLOGY
Payer: COMMERCIAL

## 2022-10-25 DIAGNOSIS — Z91.89 AT RISK FOR BREASTFEEDING DIFFICULTY: ICD-10-CM

## 2022-10-25 PROBLEM — Z34.90 ENCOUNTER FOR ELECTIVE INDUCTION OF LABOR: Status: ACTIVE | Noted: 2022-10-25

## 2022-10-25 LAB
ALBUMIN SERPL BCP-MCNC: 3.8 G/DL (ref 3.2–4.9)
ALBUMIN/GLOB SERPL: 1.1 G/DL
ALP SERPL-CCNC: 148 U/L (ref 30–99)
ALT SERPL-CCNC: 15 U/L (ref 2–50)
ANION GAP SERPL CALC-SCNC: 15 MMOL/L (ref 7–16)
AST SERPL-CCNC: 33 U/L (ref 12–45)
BASOPHILS # BLD AUTO: 0.3 % (ref 0–1.8)
BASOPHILS # BLD: 0.02 K/UL (ref 0–0.12)
BILIRUB SERPL-MCNC: 0.4 MG/DL (ref 0.1–1.5)
BUN SERPL-MCNC: 6 MG/DL (ref 8–22)
CALCIUM SERPL-MCNC: 9.8 MG/DL (ref 8.5–10.5)
CHLORIDE SERPL-SCNC: 102 MMOL/L (ref 96–112)
CO2 SERPL-SCNC: 18 MMOL/L (ref 20–33)
CREAT SERPL-MCNC: 0.66 MG/DL (ref 0.5–1.4)
EOSINOPHIL # BLD AUTO: 0.02 K/UL (ref 0–0.51)
EOSINOPHIL NFR BLD: 0.3 % (ref 0–6.9)
ERYTHROCYTE [DISTWIDTH] IN BLOOD BY AUTOMATED COUNT: 42.4 FL (ref 35.9–50)
GFR SERPLBLD CREATININE-BSD FMLA CKD-EPI: 120 ML/MIN/1.73 M 2
GLOBULIN SER CALC-MCNC: 3.5 G/DL (ref 1.9–3.5)
GLUCOSE SERPL-MCNC: 74 MG/DL (ref 65–99)
HCT VFR BLD AUTO: 41 % (ref 37–47)
HGB BLD-MCNC: 14.1 G/DL (ref 12–16)
HOLDING TUBE BB 8507: NORMAL
IMM GRANULOCYTES # BLD AUTO: 0.03 K/UL (ref 0–0.11)
IMM GRANULOCYTES NFR BLD AUTO: 0.4 % (ref 0–0.9)
LYMPHOCYTES # BLD AUTO: 2.35 K/UL (ref 1–4.8)
LYMPHOCYTES NFR BLD: 30.1 % (ref 22–41)
MCH RBC QN AUTO: 29.6 PG (ref 27–33)
MCHC RBC AUTO-ENTMCNC: 34.4 G/DL (ref 33.6–35)
MCV RBC AUTO: 86 FL (ref 81.4–97.8)
MONOCYTES # BLD AUTO: 0.39 K/UL (ref 0–0.85)
MONOCYTES NFR BLD AUTO: 5 % (ref 0–13.4)
NEUTROPHILS # BLD AUTO: 5.01 K/UL (ref 2–7.15)
NEUTROPHILS NFR BLD: 63.9 % (ref 44–72)
NRBC # BLD AUTO: 0 K/UL
NRBC BLD-RTO: 0 /100 WBC
PLATELET # BLD AUTO: 191 K/UL (ref 164–446)
PMV BLD AUTO: 12.2 FL (ref 9–12.9)
POTASSIUM SERPL-SCNC: 4 MMOL/L (ref 3.6–5.5)
PROT SERPL-MCNC: 7.3 G/DL (ref 6–8.2)
RBC # BLD AUTO: 4.77 M/UL (ref 4.2–5.4)
SODIUM SERPL-SCNC: 135 MMOL/L (ref 135–145)
T PALLIDUM AB SER QL IA: NORMAL
WBC # BLD AUTO: 7.8 K/UL (ref 4.8–10.8)

## 2022-10-25 PROCEDURE — 80053 COMPREHEN METABOLIC PANEL: CPT

## 2022-10-25 PROCEDURE — 10907ZC DRAINAGE OF AMNIOTIC FLUID, THERAPEUTIC FROM PRODUCTS OF CONCEPTION, VIA NATURAL OR ARTIFICIAL OPENING: ICD-10-PCS | Performed by: OBSTETRICS & GYNECOLOGY

## 2022-10-25 PROCEDURE — 3E033VJ INTRODUCTION OF OTHER HORMONE INTO PERIPHERAL VEIN, PERCUTANEOUS APPROACH: ICD-10-PCS | Performed by: OBSTETRICS & GYNECOLOGY

## 2022-10-25 PROCEDURE — 700105 HCHG RX REV CODE 258: Performed by: OBSTETRICS & GYNECOLOGY

## 2022-10-25 PROCEDURE — 700105 HCHG RX REV CODE 258

## 2022-10-25 PROCEDURE — 36415 COLL VENOUS BLD VENIPUNCTURE: CPT

## 2022-10-25 PROCEDURE — 86780 TREPONEMA PALLIDUM: CPT

## 2022-10-25 PROCEDURE — 700111 HCHG RX REV CODE 636 W/ 250 OVERRIDE (IP)

## 2022-10-25 PROCEDURE — 700111 HCHG RX REV CODE 636 W/ 250 OVERRIDE (IP): Performed by: ANESTHESIOLOGY

## 2022-10-25 PROCEDURE — 01967 NEURAXL LBR ANES VAG DLVR: CPT | Performed by: ANESTHESIOLOGY

## 2022-10-25 PROCEDURE — 770002 HCHG ROOM/CARE - OB PRIVATE (112)

## 2022-10-25 PROCEDURE — 700101 HCHG RX REV CODE 250: Performed by: ANESTHESIOLOGY

## 2022-10-25 PROCEDURE — 85025 COMPLETE CBC W/AUTO DIFF WBC: CPT

## 2022-10-25 PROCEDURE — 303615 HCHG EPIDURAL/SPINAL ANESTHESIA FOR LABOR

## 2022-10-25 RX ORDER — SODIUM CHLORIDE, SODIUM LACTATE, POTASSIUM CHLORIDE, CALCIUM CHLORIDE 600; 310; 30; 20 MG/100ML; MG/100ML; MG/100ML; MG/100ML
INJECTION, SOLUTION INTRAVENOUS CONTINUOUS
Status: DISCONTINUED | OUTPATIENT
Start: 2022-10-25 | End: 2022-10-27 | Stop reason: HOSPADM

## 2022-10-25 RX ORDER — OXYTOCIN 10 [USP'U]/ML
10 INJECTION, SOLUTION INTRAMUSCULAR; INTRAVENOUS
Status: COMPLETED | OUTPATIENT
Start: 2022-10-25 | End: 2022-10-26

## 2022-10-25 RX ORDER — TERBUTALINE SULFATE 1 MG/ML
0.25 INJECTION, SOLUTION SUBCUTANEOUS
Status: DISCONTINUED | OUTPATIENT
Start: 2022-10-25 | End: 2022-10-26 | Stop reason: HOSPADM

## 2022-10-25 RX ORDER — IBUPROFEN 800 MG/1
800 TABLET ORAL
Status: DISCONTINUED | OUTPATIENT
Start: 2022-10-25 | End: 2022-10-26 | Stop reason: HOSPADM

## 2022-10-25 RX ORDER — BUPIVACAINE HYDROCHLORIDE 2.5 MG/ML
INJECTION, SOLUTION EPIDURAL; INFILTRATION; INTRACAUDAL
Status: COMPLETED | OUTPATIENT
Start: 2022-10-25 | End: 2022-10-25

## 2022-10-25 RX ORDER — LIDOCAINE HYDROCHLORIDE 10 MG/ML
20 INJECTION, SOLUTION INFILTRATION; PERINEURAL
Status: DISCONTINUED | OUTPATIENT
Start: 2022-10-25 | End: 2022-10-26 | Stop reason: HOSPADM

## 2022-10-25 RX ORDER — ROPIVACAINE HYDROCHLORIDE 2 MG/ML
INJECTION, SOLUTION EPIDURAL; INFILTRATION; PERINEURAL
Status: DISCONTINUED
Start: 2022-10-25 | End: 2022-10-25

## 2022-10-25 RX ORDER — LIDOCAINE HYDROCHLORIDE AND EPINEPHRINE 15; 5 MG/ML; UG/ML
INJECTION, SOLUTION EPIDURAL
Status: COMPLETED | OUTPATIENT
Start: 2022-10-25 | End: 2022-10-25

## 2022-10-25 RX ORDER — ACETAMINOPHEN 500 MG
1000 TABLET ORAL
Status: DISCONTINUED | OUTPATIENT
Start: 2022-10-25 | End: 2022-10-26 | Stop reason: HOSPADM

## 2022-10-25 RX ORDER — BUPIVACAINE HYDROCHLORIDE 2.5 MG/ML
INJECTION, SOLUTION EPIDURAL; INFILTRATION; INTRACAUDAL
Status: COMPLETED
Start: 2022-10-25 | End: 2022-10-25

## 2022-10-25 RX ADMIN — OXYTOCIN 1 MILLI-UNITS/MIN: 10 INJECTION, SOLUTION INTRAMUSCULAR; INTRAVENOUS at 11:52

## 2022-10-25 RX ADMIN — SODIUM CHLORIDE, POTASSIUM CHLORIDE, SODIUM LACTATE AND CALCIUM CHLORIDE: 600; 310; 30; 20 INJECTION, SOLUTION INTRAVENOUS at 11:52

## 2022-10-25 RX ADMIN — ROPIVACAINE HYDROCHLORIDE 200 MG: 2 INJECTION, SOLUTION EPIDURAL; INFILTRATION at 21:45

## 2022-10-25 RX ADMIN — BUPIVACAINE HYDROCHLORIDE 5 ML: 2.5 INJECTION, SOLUTION EPIDURAL; INFILTRATION; INTRACAUDAL; PERINEURAL at 21:39

## 2022-10-25 RX ADMIN — ROPIVACAINE HYDROCHLORIDE 200 MG: 2 INJECTION, SOLUTION EPIDURAL; INFILTRATION; PERINEURAL at 21:45

## 2022-10-25 RX ADMIN — SODIUM CHLORIDE, POTASSIUM CHLORIDE, SODIUM LACTATE AND CALCIUM CHLORIDE: 600; 310; 30; 20 INJECTION, SOLUTION INTRAVENOUS at 19:40

## 2022-10-25 RX ADMIN — FENTANYL CITRATE 100 MCG: 50 INJECTION, SOLUTION INTRAMUSCULAR; INTRAVENOUS at 21:39

## 2022-10-25 RX ADMIN — LIDOCAINE HYDROCHLORIDE,EPINEPHRINE BITARTRATE 3 ML: 15; .005 INJECTION, SOLUTION EPIDURAL; INFILTRATION; INTRACAUDAL; PERINEURAL at 21:39

## 2022-10-25 ASSESSMENT — LIFESTYLE VARIABLES
TOTAL SCORE: 0
HOW MANY TIMES IN THE PAST YEAR HAVE YOU HAD 5 OR MORE DRINKS IN A DAY: 0
AVERAGE NUMBER OF DAYS PER WEEK YOU HAVE A DRINK CONTAINING ALCOHOL: 0
DOES PATIENT WANT TO STOP DRINKING: CANNOT ASSESS
ALCOHOL_USE: NO
EVER FELT BAD OR GUILTY ABOUT YOUR DRINKING: NO
EVER_SMOKED: NEVER
ON A TYPICAL DAY WHEN YOU DRINK ALCOHOL HOW MANY DRINKS DO YOU HAVE: 0
HAVE YOU EVER FELT YOU SHOULD CUT DOWN ON YOUR DRINKING: NO
TOTAL SCORE: 0
HAVE PEOPLE ANNOYED YOU BY CRITICIZING YOUR DRINKING: NO
EVER HAD A DRINK FIRST THING IN THE MORNING TO STEADY YOUR NERVES TO GET RID OF A HANGOVER: NO
CONSUMPTION TOTAL: NEGATIVE
TOTAL SCORE: 0

## 2022-10-25 ASSESSMENT — PATIENT HEALTH QUESTIONNAIRE - PHQ9
2. FEELING DOWN, DEPRESSED, IRRITABLE, OR HOPELESS: NOT AT ALL
4. FEELING TIRED OR HAVING LITTLE ENERGY: NOT AT ALL
8. MOVING OR SPEAKING SO SLOWLY THAT OTHER PEOPLE COULD HAVE NOTICED. OR THE OPPOSITE, BEING SO FIGETY OR RESTLESS THAT YOU HAVE BEEN MOVING AROUND A LOT MORE THAN USUAL: NOT AT ALL
9. THOUGHTS THAT YOU WOULD BE BETTER OFF DEAD, OR OF HURTING YOURSELF: NOT AT ALL
1. LITTLE INTEREST OR PLEASURE IN DOING THINGS: SEVERAL DAYS
SUM OF ALL RESPONSES TO PHQ9 QUESTIONS 1 AND 2: 1
6. FEELING BAD ABOUT YOURSELF - OR THAT YOU ARE A FAILURE OR HAVE LET YOURSELF OR YOUR FAMILY DOWN: NOT AL ALL
SUM OF ALL RESPONSES TO PHQ QUESTIONS 1-9: 5
3. TROUBLE FALLING OR STAYING ASLEEP OR SLEEPING TOO MUCH: NEARLY EVERY DAY
7. TROUBLE CONCENTRATING ON THINGS, SUCH AS READING THE NEWSPAPER OR WATCHING TELEVISION: NOT AT ALL
5. POOR APPETITE OR OVEREATING: SEVERAL DAYS

## 2022-10-25 ASSESSMENT — COPD QUESTIONNAIRES
HAVE YOU SMOKED AT LEAST 100 CIGARETTES IN YOUR ENTIRE LIFE: NO/DON'T KNOW
IN THE PAST 12 MONTHS DO YOU DO LESS THAN YOU USED TO BECAUSE OF YOUR BREATHING PROBLEMS: DISAGREE/UNSURE
DURING THE PAST 4 WEEKS HOW MUCH DID YOU FEEL SHORT OF BREATH: NONE/LITTLE OF THE TIME
COPD SCREENING SCORE: 0
DO YOU EVER COUGH UP ANY MUCUS OR PHLEGM?: NO/ONLY WITH OCCASIONAL COLDS OR INFECTIONS

## 2022-10-25 ASSESSMENT — PAIN SCALES - GENERAL: PAINLEVEL: 0 - NO PAIN

## 2022-10-25 ASSESSMENT — FIBROSIS 4 INDEX: FIB4 SCORE: .7045454545454545455

## 2022-10-25 NOTE — PROGRESS NOTES
"900 - Pt is a  here for elective IOL 39w0d. Denies LOF, VB, pain. Reports feeling mild UC, active FM, HA and vision changes \"seeing stars\". Pt VS are stable WNL. Reports N/V/ weight loss throughout pregnancy up to today. External monitors applied. Mild/moderate contractions palpated, talha every 5-10 min. Pt reports no labor pain/ mild back pain.     - Report given at BS to Yue CELESTE. Pt has no questions or concerns at this time. FOB \"kayla\" at BS for support. Pt resting comfortably.  "

## 2022-10-25 NOTE — CARE PLAN
The patient is Stable - Low risk of patient condition declining or worsening    Shift Goals  Clinical Goals: have a healthy baby  Patient Goals: pain/ N/V control  Family Goals: support      Problem: Knowledge Deficit - L&D  Goal: Patient and family/caregivers will demonstrate understanding of plan of care, disease process/condition, diagnostic tests and medications  Outcome: Progressing   POC discussed with pt. Pt verbalizes understanding and asks questions as needed.     Problem: Pain  Goal: Patient's pain will be alleviated or reduced to the patient’s comfort goal  Outcome: Progressing   Pain management options discussed with pt. Pt able to verbalize a pain rating on pain scale. Pt will ask for intervention as needed.

## 2022-10-25 NOTE — H&P
OB H&P:    CC: induction of labor     HPI:  Ms. Gisela Dykes is a 32 y.o.  @ 39w0d by LMP presents for elective induction of labor     Contractions: Yes   Loss of fluid: No   Vaginal bleeding: No   Fetal movement: present    [facility] with [facility]    PNL:  Blood Type A pos, Rubella immune, HIV neg, TrepAb neg, HBsAg NR, GC/CT neg/neg.   1 HR GTT: 129  3 HR GTT: 113  GBS -    ROS:  Const: denies fevers, general concerns  CV/resp: reports no concerns  GI: denies abd pain, GI concerns  : see HPI  Neuro: denies HA/vision changes    OB History    Para Term  AB Living   4 2 2   1 2   SAB IAB Ectopic Molar Multiple Live Births   1         2      # Outcome Date GA Lbr Osvaldo/2nd Weight Sex Delivery Anes PTL Lv   4 Current            3 SAB  9w0d          2 Term 20 40w0d  3.118 kg (6 lb 14 oz) F Vag-Spont EPI  SOLE   1 Term 18 40w0d  2.977 kg (6 lb 9 oz) M Vag-Spont EPI N SOLE     GYN: denies STIs, no cervical procedures    Past Medical History:   Diagnosis Date    Patient denies medical problems        Past Surgical History:   Procedure Laterality Date    ABDOMINAL EXPLORATION  2021    gallbladder removed        No current facility-administered medications on file prior to encounter.     Current Outpatient Medications on File Prior to Encounter   Medication Sig Dispense Refill    ASPIRIN 81 PO Take  by mouth.      progesterone (PROMETRIUM) 200 MG capsule INSERT 1 CAPSULE VAGINALLY ONCE A DAY AT BEDTIME      promethazine (PHENERGAN) 25 MG Tab TAKE 1 TABLET BY MOUTH EVERY 6 HOURS AS NEEDED FOR NAUSEA OR VOMITING (Patient not taking: Reported on 10/25/2022) 30 Tablet 2    Prenatal MV-Min-Fe Fum-FA-DHA (PRENATAL 1 PO) Take  by mouth.         Family History   Problem Relation Age of Onset    No Known Problems Mother     No Known Problems Father     No Known Problems Sister     No Known Problems Brother        Social History     Tobacco Use    Smoking status: Never     "Smokeless tobacco: Never   Vaping Use    Vaping Use: Never used   Substance Use Topics    Alcohol use: Not Currently    Drug use: Never         PE:  Vitals:    10/25/22 0856 10/25/22 0900 10/25/22 0936 10/25/22 1100   BP: 120/83 120/83  (!) 136/91   Pulse: 91 77  74   Resp: 18      Temp: 36.1 °C (97 °F)      TempSrc: Temporal      SpO2: 97% 96%     Weight:   91.2 kg (201 lb)    Height:   1.575 m (5' 2\")        GEN: AAO, NAD  HEENT: normocephalic, atraumatic, EOMI  CV: rrr, no m/r/g  Resp: CTAB, no w/r/r  Abd: soft, gravid, NT  Ext: NT, no peripheral edema  Derm: no visible lesions or rashes  Neuro: grossly intact    SVE: 2/thick/-2  FHT: Baseline 123-150s, variability/+ accels - decels -  Karo: Contractions yes    A/P: 32 y.o.  @ 39w0d by LMP presents for elective induction of labor.     - Admit to L&D  - Initiate routine intrapartum care  - Cat I tracing, continue to monitor EFM  - Epidural Anesthesia, mother still deciding.   - LR @ 125ml/h    Nakita Adams M.D.     "

## 2022-10-26 LAB
ERYTHROCYTE [DISTWIDTH] IN BLOOD BY AUTOMATED COUNT: 42.4 FL (ref 35.9–50)
HCT VFR BLD AUTO: 35.5 % (ref 37–47)
HGB BLD-MCNC: 12.4 G/DL (ref 12–16)
MCH RBC QN AUTO: 30.4 PG (ref 27–33)
MCHC RBC AUTO-ENTMCNC: 34.9 G/DL (ref 33.6–35)
MCV RBC AUTO: 87 FL (ref 81.4–97.8)
PLATELET # BLD AUTO: 161 K/UL (ref 164–446)
PMV BLD AUTO: 11.5 FL (ref 9–12.9)
RBC # BLD AUTO: 4.08 M/UL (ref 4.2–5.4)
WBC # BLD AUTO: 8.9 K/UL (ref 4.8–10.8)

## 2022-10-26 PROCEDURE — A9270 NON-COVERED ITEM OR SERVICE: HCPCS

## 2022-10-26 PROCEDURE — 85027 COMPLETE CBC AUTOMATED: CPT

## 2022-10-26 PROCEDURE — 0HQ9XZZ REPAIR PERINEUM SKIN, EXTERNAL APPROACH: ICD-10-PCS | Performed by: OBSTETRICS & GYNECOLOGY

## 2022-10-26 PROCEDURE — 59409 OBSTETRICAL CARE: CPT

## 2022-10-26 PROCEDURE — 770002 HCHG ROOM/CARE - OB PRIVATE (112)

## 2022-10-26 PROCEDURE — 700111 HCHG RX REV CODE 636 W/ 250 OVERRIDE (IP): Performed by: OBSTETRICS & GYNECOLOGY

## 2022-10-26 PROCEDURE — 700105 HCHG RX REV CODE 258: Performed by: OBSTETRICS & GYNECOLOGY

## 2022-10-26 PROCEDURE — 59400 OBSTETRICAL CARE: CPT | Performed by: OBSTETRICS & GYNECOLOGY

## 2022-10-26 PROCEDURE — 700102 HCHG RX REV CODE 250 W/ 637 OVERRIDE(OP)

## 2022-10-26 PROCEDURE — 700111 HCHG RX REV CODE 636 W/ 250 OVERRIDE (IP)

## 2022-10-26 PROCEDURE — 36415 COLL VENOUS BLD VENIPUNCTURE: CPT

## 2022-10-26 PROCEDURE — 304965 HCHG RECOVERY SERVICES

## 2022-10-26 RX ORDER — IBUPROFEN 800 MG/1
800 TABLET ORAL EVERY 8 HOURS PRN
Status: DISCONTINUED | OUTPATIENT
Start: 2022-10-26 | End: 2022-10-28 | Stop reason: HOSPADM

## 2022-10-26 RX ORDER — ONDANSETRON 2 MG/ML
4 INJECTION INTRAMUSCULAR; INTRAVENOUS EVERY 4 HOURS PRN
Status: DISCONTINUED | OUTPATIENT
Start: 2022-10-26 | End: 2022-10-28 | Stop reason: HOSPADM

## 2022-10-26 RX ORDER — VITAMIN A ACETATE, BETA CAROTENE, ASCORBIC ACID, CHOLECALCIFEROL, .ALPHA.-TOCOPHEROL ACETATE, DL-, THIAMINE MONONITRATE, RIBOFLAVIN, NIACINAMIDE, PYRIDOXINE HYDROCHLORIDE, FOLIC ACID, CYANOCOBALAMIN, CALCIUM CARBONATE, FERROUS FUMARATE, ZINC OXIDE, CUPRIC OXIDE 3080; 12; 120; 400; 1; 1.84; 3; 20; 22; 920; 25; 200; 27; 10; 2 [IU]/1; UG/1; MG/1; [IU]/1; MG/1; MG/1; MG/1; MG/1; MG/1; [IU]/1; MG/1; MG/1; MG/1; MG/1; MG/1
1 TABLET, FILM COATED ORAL
Status: DISCONTINUED | OUTPATIENT
Start: 2022-10-26 | End: 2022-10-28 | Stop reason: HOSPADM

## 2022-10-26 RX ORDER — DOCUSATE SODIUM 100 MG/1
100 CAPSULE, LIQUID FILLED ORAL 2 TIMES DAILY PRN
Status: DISCONTINUED | OUTPATIENT
Start: 2022-10-26 | End: 2022-10-28 | Stop reason: HOSPADM

## 2022-10-26 RX ORDER — SODIUM CHLORIDE, SODIUM LACTATE, POTASSIUM CHLORIDE, CALCIUM CHLORIDE 600; 310; 30; 20 MG/100ML; MG/100ML; MG/100ML; MG/100ML
INJECTION, SOLUTION INTRAVENOUS PRN
Status: DISCONTINUED | OUTPATIENT
Start: 2022-10-26 | End: 2022-10-28 | Stop reason: HOSPADM

## 2022-10-26 RX ORDER — NIFEDIPINE 10 MG/1
10 CAPSULE ORAL
Status: DISCONTINUED | OUTPATIENT
Start: 2022-10-26 | End: 2022-10-28 | Stop reason: HOSPADM

## 2022-10-26 RX ORDER — ACETAMINOPHEN 500 MG
1000 TABLET ORAL EVERY 6 HOURS PRN
Status: DISCONTINUED | OUTPATIENT
Start: 2022-10-26 | End: 2022-10-28 | Stop reason: HOSPADM

## 2022-10-26 RX ORDER — MISOPROSTOL 200 UG/1
600 TABLET ORAL
Status: DISCONTINUED | OUTPATIENT
Start: 2022-10-26 | End: 2022-10-28 | Stop reason: HOSPADM

## 2022-10-26 RX ORDER — ROPIVACAINE HYDROCHLORIDE 2 MG/ML
INJECTION, SOLUTION EPIDURAL; INFILTRATION; PERINEURAL CONTINUOUS
Status: DISCONTINUED | OUTPATIENT
Start: 2022-10-26 | End: 2022-10-27 | Stop reason: HOSPADM

## 2022-10-26 RX ORDER — BISACODYL 10 MG
10 SUPPOSITORY, RECTAL RECTAL PRN
Status: DISCONTINUED | OUTPATIENT
Start: 2022-10-26 | End: 2022-10-28 | Stop reason: HOSPADM

## 2022-10-26 RX ADMIN — OXYTOCIN 125 ML/HR: 10 INJECTION, SOLUTION INTRAMUSCULAR; INTRAVENOUS at 01:45

## 2022-10-26 RX ADMIN — IBUPROFEN 800 MG: 800 TABLET, FILM COATED ORAL at 13:34

## 2022-10-26 RX ADMIN — OXYTOCIN 10 UNITS: 10 INJECTION, SOLUTION INTRAMUSCULAR; INTRAVENOUS at 01:03

## 2022-10-26 RX ADMIN — ACETAMINOPHEN 1000 MG: 500 TABLET ORAL at 18:20

## 2022-10-26 RX ADMIN — PRENATAL WITH FERROUS FUM AND FOLIC ACID 1 TABLET: 3080; 920; 120; 400; 22; 1.84; 3; 20; 10; 1; 12; 200; 27; 25; 2 TABLET ORAL at 07:51

## 2022-10-26 RX ADMIN — ACETAMINOPHEN 1000 MG: 500 TABLET ORAL at 12:18

## 2022-10-26 RX ADMIN — OXYTOCIN 10 UNITS: 10 INJECTION, SOLUTION INTRAMUSCULAR; INTRAVENOUS at 01:55

## 2022-10-26 RX ADMIN — ACETAMINOPHEN 1000 MG: 500 TABLET ORAL at 05:00

## 2022-10-26 RX ADMIN — ONDANSETRON 4 MG: 2 INJECTION INTRAMUSCULAR; INTRAVENOUS at 01:29

## 2022-10-26 ASSESSMENT — PAIN DESCRIPTION - PAIN TYPE
TYPE: ACUTE PAIN

## 2022-10-26 ASSESSMENT — EDINBURGH POSTNATAL DEPRESSION SCALE (EPDS)
I HAVE BEEN SO UNHAPPY THAT I HAVE BEEN CRYING: NO, NEVER
I HAVE BEEN ANXIOUS OR WORRIED FOR NO GOOD REASON: NO, NOT AT ALL
I HAVE FELT SAD OR MISERABLE: NO, NOT AT ALL
THINGS HAVE BEEN GETTING ON TOP OF ME: NO, I HAVE BEEN COPING AS WELL AS EVER
I HAVE BLAMED MYSELF UNNECESSARILY WHEN THINGS WENT WRONG: NO, NEVER
I HAVE BEEN SO UNHAPPY THAT I HAVE HAD DIFFICULTY SLEEPING: NOT AT ALL
I HAVE BEEN ABLE TO LAUGH AND SEE THE FUNNY SIDE OF THINGS: AS MUCH AS I ALWAYS COULD
THE THOUGHT OF HARMING MYSELF HAS OCCURRED TO ME: NEVER
I HAVE LOOKED FORWARD WITH ENJOYMENT TO THINGS: AS MUCH AS I EVER DID
I HAVE FELT SCARED OR PANICKY FOR NO GOOD REASON: YES, SOMETIMES

## 2022-10-26 ASSESSMENT — PAIN SCALES - GENERAL: PAIN_LEVEL: 3

## 2022-10-26 NOTE — ANESTHESIA PROCEDURE NOTES
Epidural Block    Date/Time: 10/25/2022 9:39 PM  Performed by: Tay Marte M.D.  Authorized by: Tay Marte M.D.     Patient Location:  OB  Start Time:  10/25/2022 9:39 PM  Reason for Block: labor analgesia    patient identified, IV checked, site marked, risks and benefits discussed, surgical consent, monitors and equipment checked, pre-op evaluation and timeout performed    Patient Position:  Sitting  Prep: ChloraPrep, patient draped and sterile technique    Monitoring:  Blood pressure, continuous pulse oximetry and heart rate  Approach:  Midline  Location:  L3-L4  Injection Technique:  REYES saline  Skin infiltration:  Lidocaine  Strength:  1%  Dose:  3ml  Needle Type:  Tuohy  Needle Gauge:  17 G  Needle Length:  3.5 in  Loss of resistance::  6  Catheter Size:  19 G  Catheter at Skin Depth:  12  Test Dose Result:  Negative   Placed with two attempts

## 2022-10-26 NOTE — ANESTHESIA PREPROCEDURE EVALUATION
Date: 10/25/22  Procedure: Labor Epidural         Relevant Problems   NEURO   (positive) Chronic nonintractable headache      Other   (positive) Encounter for elective induction of labor   (positive) Labor and delivery indication for care or intervention   (positive) Obesity affecting pregnancy   (positive) Vomiting or nausea of pregnancy       Physical Exam    Airway   Mallampati: II  TM distance: >3 FB  Neck ROM: full       Cardiovascular - normal exam  Rhythm: regular  Rate: normal  (-) murmur     Dental - normal exam           Pulmonary - normal exam  Breath sounds clear to auscultation     Abdominal   (+) obese     Neurological - normal exam                 Anesthesia Plan    ASA 2       Plan - epidural   Neuraxial block will be labor analgesia                  Pertinent diagnostic labs and testing reviewed    Informed Consent:    Anesthetic plan and risks discussed with patient.

## 2022-10-26 NOTE — CARE PLAN
The patient is Stable - Low risk of patient condition declining or worsening    Shift Goals  Clinical Goals: cateogry 1 fetal heart tracing, cervical change  Patient Goals: healthy mom, healthy baby  Family Goals: support    Progress made toward(s) clinical / shift goals:    Problem: Knowledge Deficit - L&D  Goal: Patient and family/caregivers will demonstrate understanding of plan of care, disease process/condition, diagnostic tests and medications  Outcome: Progressing     Problem: Risk for Excess Fluid Volume  Goal: Patient will demonstrate pulse, blood pressure and neurologic signs within expected ranges and without any respiratory complications  Outcome: Progressing     Problem: Nutrition Deficit  Goal: Patient will verbalize understanding of individual dietary needs  Outcome: Progressing     Problem: Pain  Goal: Patient's pain will be alleviated or reduced to the patient’s comfort goal  Outcome: Progressing     Problem: Psychosocial - L&D  Goal: Patient's level of anxiety will decrease  Outcome: Progressing  Goal: Patient will be able to discuss coping skills during hospitalization  Outcome: Progressing  Goal: Patient's ability to re-evaluate and adapt role responsibilities will improve  Outcome: Progressing     Problem: Risk for Venous Thromboembolism (VTE)  Goal: VTE prevention measures will be implemented and patient will remain free from VTE  Outcome: Progressing     Problem: Risk for Infection and Impaired Wound Healing  Goal: Patient will remain free from infection  Outcome: Progressing     Problem: Risk for Fluid Imbalance  Goal: Patient's fluid volume balance will be maintained or improve  Outcome: Progressing     Problem: Risk for Injury  Goal: Patient and fetus will be free of preventable injury/complications  Outcome: Progressing     Problem: Discharge Barriers/Planning  Goal: Patient's continuum of care needs are met  Outcome: Progressing

## 2022-10-26 NOTE — PROGRESS NOTES
0442: Patient arrived to floor with baby and FOB. Bedside report received by L&D RN. Bands/ cuddles verified. Oriented to postpartum admission process and usage of call light.

## 2022-10-26 NOTE — PROGRESS NOTES
S: Pt is now comfortable with epidural. Desires AROM.      O:    Vitals:    10/25/22 1145 10/25/22 1205 10/25/22 1755 10/25/22 1919   BP:  114/77 119/76 126/84   Pulse:  90 74 77   Resp:  18 18 16   Temp: 35.9 °C (96.7 °F) 36.4 °C (97.6 °F) 36.1 °C (97 °F) 36.1 °C (97 °F)   TempSrc: Temporal Temporal Temporal Temporal   SpO2:       Weight:       Height:               FHTs:  Baseline 140, + accels, variable decel x1, mod variability        [place]: Contractions q 2-3 minutes, mild to palpation, pitocin @ 12 milliunits        SVE: 3/-2, AROM for clear fluid at this time       A/P:  1.  IUP @ 39w0d            2.  Reactive, Cat 2 FHTs             3.  Labor progress: latent phase, now ruptured membranes           4.  Plan: continue to titrate pitocin as appropriate           5. Anticipate       Melina Kimball CNM

## 2022-10-26 NOTE — PROGRESS NOTES
"S: Pt is feeling a lot of anxiety regarding epidural. States she has \"some PTSD\" from how her previous epidurals went. Did not have a good experience. This time she really wants a good quality epidural prior to active labor. Declines AROM until that time. FOB present and supportive at bedside. Expecting XX \"Lore\".      Denies hx asthma, blood clots, or hypertension    O:    Vitals:    10/25/22 1145 10/25/22 1205 10/25/22 1755 10/25/22 1919   BP:  114/77 119/76 126/84   Pulse:  90 74 77   Resp:  18 18 16   Temp: 35.9 °C (96.7 °F) 36.4 °C (97.6 °F) 36.1 °C (97 °F) 36.1 °C (97 °F)   TempSrc: Temporal Temporal Temporal Temporal   SpO2:       Weight:       Height:               FHTs:  Baseline 140, + accels, no decels, mod variability        Petal: Contractions q 1-4 minutes, mild to mod to palpation, pitocin @ 12 milliunits        SVE: 360/-2   Labs: Reviewed - noted GBS negative, CMP and CBC on admit WNL      A/P:  1.  IUP @ 39w0d            2.  Reactive, Cat 1 FHTs             3.  Labor progress: latent phase           4.  Plan: hold off on titrating pitocin and AROM until patient can get epidural (anesthesia availability), after that time will proceed with AROM when appropriate           5. Anticipate     Melina HOOPERM  Attending MD: Dr. Mercado  "

## 2022-10-26 NOTE — ANESTHESIA POSTPROCEDURE EVALUATION
Patient: Gisela Dykes    Procedure Summary     Date: 10/25/22 Room / Location:     Anesthesia Start: 2127 Anesthesia Stop: 10/26/22 0053    Procedure: Labor Epidural Diagnosis:     Scheduled Providers:  Responsible Provider: Tay Marte M.D.    Anesthesia Type: epidural ASA Status: 2          Final Anesthesia Type: epidural  Last vitals  BP   Blood Pressure: 119/68    Temp   35.9 °C (96.7 °F)    Pulse   70   Resp   16    SpO2   94 %      Anesthesia Post Evaluation    Patient location during evaluation: floor  Patient participation: complete - patient participated  Level of consciousness: awake and alert  Pain score: 3    Airway patency: patent  Anesthetic complications: no  Cardiovascular status: hemodynamically stable  Respiratory status: acceptable  Hydration status: euvolemic    PONV: none          There were no known notable events for this encounter.     Nurse Pain Score: 7 (NPRS)

## 2022-10-26 NOTE — CARE PLAN
The patient is Stable - Low risk of patient condition declining or worsening    Shift Goals  Clinical Goals: Monitor lochia, comfort  Patient Goals: Rest  Family Goals: support    Progress made toward(s) clinical / shift goals:    Problem: Psychosocial - Postpartum  Goal: Patient will verbalize and demonstrate effective bonding and parenting behavior  Outcome: Progressing     Problem: Knowledge Deficit - Postpartum  Goal: Patient will verbalize and demonstrate understanding of self and infant care  Outcome: Progressing

## 2022-10-26 NOTE — L&D DELIVERY NOTE
"Gisela Dykes is a 32 y.o. female    VAGINAL DELIVERY NOTE:    OB History    Para Term  AB Living   4 2 2   1 2   SAB IAB Ectopic Molar Multiple Live Births   1         2      # Outcome Date GA Lbr Osvaldo/2nd Weight Sex Delivery Anes PTL Lv   4 Current            3 SAB  9w0d          2 Term 20 40w0d  3.118 kg (6 lb 14 oz) F Vag-Spont EPI  SOLE   1 Term 18 40w0d  2.977 kg (6 lb 9 oz) M Vag-Spont EPI N SOLE       Weeks gestation: 39w1d  Diagnosis:   IUP at 39w1d  IOL elective  GBS negative      Labor course: Gisela was admitted on 10/25/2022 with/for eIOL. Induction/augmentation measures: pit and AROM. She progressed to complete dilation around 0045 and began active second stage at that time. Pushing efforts were adequate.    On 10/26/2022  at 0053, this 32 y.o.  at 39w1d , GBS negative female delivered via NSVB under Epidural anesthesia. Delivery of a viable Female infant \"Lore\" with APGAR scores of 8 and 8 at one and five minutes respectively. Fetal head presented in OA and restituted LOT with right shoulder anterior. Nuchal cord present: yes - triple nuchal cord, loose, reduced prior to delivery of the body. Body cord also present. Shoulders delivered easily and infant to maternal abdomen for delayed cord clamping until cessation of cord pulsations (>60 seconds).  Spontaneous cry. Cord doubly clamped by this CNM and cut by FOB.  Spontaneous delivery of anne placenta, grossly intact with 3VC. Pitocin infusing in IVF. Fundus firm with light lochia.  Upon examination of the vaginal vault, cervix, perineum, and vulva, a 1st degree laceration was identified. Laceration was repaired in the usual sterile fashion using at 3-0 vicryl suture. Excellent approximation and hemostasis.     EBL: 150 ml.   Sponge and needle counts correct: yes    Infant weight: pending    Tolerated procedure well  Pt and infant are stable and bonding.  Patient and infant will be transferred to " postpartum unit to recover  Anticipate routine postpartum care with discharge home expected on PPD#1    Melina Kimball CNM, APRN  Dr Mercado is the attending MD today

## 2022-10-26 NOTE — PROGRESS NOTES
" - Report received from ROULA Calvert. , 39/0, admitted for an elective induction. POC discussed.      - TUCKER Summers at bedside. POC discussing including pain management and AROM.      - Patient requesting epidural.      - Anesthesia out of OR and notified of patient request for epidural.     - Anesthesia at bedside.     - Test dose given with no complications.      - Patient experiencing nausea and vomiting, BP 93/52. Subsequent BP 97/54. BP, nausea, and vomiting resolved spontaneously.      - TUCKER Summers at bedside for AROM. AROM clear with scant fluid.     0008 - Patient called out stating she felt increased pressure, requested SVE. SVE 6/100/0. TUCKER Summers notified.     5 - Patient called out stating she feels \"so much pressure in my vagina\". SVE complete/+2. Melina CEBALLOS notified.     0047 - Melina CEBALLOS at bedside for delivery.     0048 - Pushing initiated     0053 - Delivery of viable infant female, x3 nuchal cord, x1 body wrap. 8/8 APGARs.     0345 - Attempted to get patient up to restroom, but her legs were too unsteady to ambulate. Returned to bed. Will attempt again.    0420 - Patient up to restroom, positive void.    0450 - Report given to ROULA Yepez         "

## 2022-10-26 NOTE — LACTATION NOTE
This note was copied from a baby's chart.  PHILIPP is a 31 y/o  who delivered a 39 1/7 gestation infant. She latched @delivery and several times after. She states this infant latches well since the first time which was right after delivery. She has a strong history of BF her first for 1 year and the 2nd for 3.5 months with it drying up during a move to [place]. Lactation education as in assessment. PHILIPP voices understanding.     Teach to call for assessment of latch as infant is feeding or for support with latch as needed.     Referal to BF medicine. Outpatient lactation support resources given with encouragement to attend the BF Circles.

## 2022-10-27 ENCOUNTER — PHARMACY VISIT (OUTPATIENT)
Dept: PHARMACY | Facility: MEDICAL CENTER | Age: 32
End: 2022-10-27
Payer: COMMERCIAL

## 2022-10-27 PROBLEM — O21.9 VOMITING OR NAUSEA OF PREGNANCY: Status: RESOLVED | Noted: 2022-04-20 | Resolved: 2022-10-27

## 2022-10-27 PROCEDURE — 770002 HCHG ROOM/CARE - OB PRIVATE (112)

## 2022-10-27 PROCEDURE — 700102 HCHG RX REV CODE 250 W/ 637 OVERRIDE(OP)

## 2022-10-27 PROCEDURE — RXMED WILLOW AMBULATORY MEDICATION CHARGE

## 2022-10-27 PROCEDURE — A9270 NON-COVERED ITEM OR SERVICE: HCPCS

## 2022-10-27 RX ORDER — PSEUDOEPHEDRINE HCL 30 MG
100 TABLET ORAL 2 TIMES DAILY PRN
Qty: 60 CAPSULE | Refills: 2 | Status: SHIPPED | OUTPATIENT
Start: 2022-10-27

## 2022-10-27 RX ORDER — IBUPROFEN 800 MG/1
800 TABLET ORAL EVERY 8 HOURS PRN
Qty: 30 TABLET | Refills: 2 | Status: SHIPPED | OUTPATIENT
Start: 2022-10-27

## 2022-10-27 RX ORDER — ACETAMINOPHEN 500 MG
1000 TABLET ORAL EVERY 6 HOURS PRN
Qty: 30 TABLET | Refills: 2 | Status: SHIPPED | OUTPATIENT
Start: 2022-10-27

## 2022-10-27 RX ADMIN — ACETAMINOPHEN 1000 MG: 500 TABLET ORAL at 22:23

## 2022-10-27 RX ADMIN — PRENATAL WITH FERROUS FUM AND FOLIC ACID 1 TABLET: 3080; 920; 120; 400; 22; 1.84; 3; 20; 10; 1; 12; 200; 27; 25; 2 TABLET ORAL at 07:38

## 2022-10-27 RX ADMIN — IBUPROFEN 800 MG: 800 TABLET, FILM COATED ORAL at 01:37

## 2022-10-27 RX ADMIN — ACETAMINOPHEN 1000 MG: 500 TABLET ORAL at 13:57

## 2022-10-27 RX ADMIN — IBUPROFEN 800 MG: 800 TABLET, FILM COATED ORAL at 19:52

## 2022-10-27 RX ADMIN — DOCUSATE SODIUM 100 MG: 100 CAPSULE, LIQUID FILLED ORAL at 19:52

## 2022-10-27 ASSESSMENT — PAIN DESCRIPTION - PAIN TYPE
TYPE: ACUTE PAIN

## 2022-10-27 NOTE — LACTATION NOTE
This note was copied from a baby's chart.  Follow up:    Overnight events:  infant has begun to cluster feed as per MOB.  Denies pain at nipples or breast when latching.  MD order to supplement with DBM d/t no voids since delivery.  MOB also hand expressing 1-3ml colostrum after breastfeeding and syringe feeding back to infant.      Discussed paced bottle feeding and to offer breast prior to giving bottle.  MOB understands suggestions and does not desire assistance with latch at this time.  Given [facility] resources.      [facility] referral previously sent.

## 2022-10-27 NOTE — DISCHARGE INSTRUCTIONS
PATIENT EDUCATION INSTRUCTION SHEET  HAND WASHING  All family and friends should wash their hands:  Before and after holding the baby  Before feeding the baby  After using the restroom or changing the baby's diaper    SAFE SLEEP POSITIONING FOR YOUR BABY  We encourage you to be skin to skin with baby to help them transition into the world  Baby should NOT share a bed with his/her parents  Baby should sleep in the bassinet   Baby should ALWAYS be placed on his or her back to sleep, night time and at naps  Baby's sleep area should be free of any blankets, stuffed animals or any other soft items  Baby's face should be kept uncovered at all times   If baby is skin to skin keep baby covered with a blanket to keep warm  If not skin to skin place baby in a shirt or clothes and swaddle blanket to prevent baby from getting cold    BATH  If your baby has problems with temperature control, is not eating well, or is extremely sleepy we will ask that you delay giving your baby a bath     We will perform the bath in your room and encourage skin to skin after to warm up baby  Bath does not need to be done in the hospital. It is your preference  After the bath and skin to skin, we will dress your baby in a sleepsack to allow for warmth, swaddling and safe sleep practices    NAIL CARE  Keep baby’s hands covered to prevent facial scratching  You may file with a fine emery board   Please do not clip or bite nails as it could cause injury or bleeding and is a risk of infection  A good time for nail care is while your baby is sleeping and moving less    CORD CARE  Keep umbilical cord clean and dry  May see a small amount of crust around the base of the cord. Clean off with mild soap and water and dry     If cord starts to become red, swollen, develop a smell or have a discharge tell your care team immediately  Fold diaper below umbilical cord until cord falls off  Umbilical cord clamp will be removed prior to discharge once cord is  dry    CIRCUMCISION  If you plan to have your son circumcised, you must speak to your baby's doctor before the operation.  A consent form must be signed  Any concerns or questions will be addressed by the pediatrician  Baby will stay in the NBN for 30 minutes following the procedure for monitoring  Your nurse will discuss proper care procedures with you after the procedure    DIAPERING AND DRESSING BABY  For baby girls: gently wipe from front to back. Mucous or pink tinged drainage is normal  For uncircumcised baby boys: do NOT pull back the foreskin to clean the penis. Gently clean with warm water and soap  Dress baby in one more layer of clothing than you are wearing     FEEDING  Most newborns feed 8-12 times, every 24 hours. YOU MAY NEED TO WAKE YOUR BABY UP TO FEED  If breastfeeding, offer both breasts when your baby is showing feeding cues, such as rooting or bringing hand to mouth and sucking  Common for breast fed babies to feed every 1-3 hours   Please call your nurse when you are ready to breastfeed and we can help with latching the baby   Do not allow baby to go 4 hours in between feeds even if baby is feeding well at each feed. Offer breast anytime baby is showing feeding cues and at least every 2-3 hours  If you are NOT planning to feed your baby breast milk your nurse will discuss with you formula options  Care team will show you how to pace feed your baby from a bottle  Feed baby formula every 2-3 hours when your baby is showing feeding cues  Paced bottle feeding will help baby not over eat at each feed   Formula pre-made bottles can only be used for 1 feed. Once the baby has nippled on the bottle then it must be discarded      URINATION AND BOWEL MOVEMENTS  Baby should have at least 1 wet diaper and 1 bowel movement in a 24 period,   Baby will have black, sticky, tar like stool initially  Bowel movements color and type can vary from day to day  Exclusively breast fed babies will have  yellowish colored stools after transition of meconium stools  When formula feeding or breast milk is established, your baby should wet 6-8 diapers a day and have at least 2 bowel movements a day during the first month    MATERNAL WOUND CARE   Incision and care:  Most incision dressings will be removed after 24 hours  Keep clean and dry  There should not be any opening or pus from the incision  Use Incentive spirometer at least 10 times every 2 hours while in bed and awake  Walk in hallway at least 3 times a day   Sit up in chair for every meal  Do NOT lift anything heavier than your baby (over 10 pounds)  Encourage family to help participate in care of the  to allow rest and mom time to heal    Perineal Episiotomy/Laceration  May use jalen-spray bottle, witch hazel pads and dermaplast spray for comfort  Use jalen-spray bottle after urinating to cleanse perineal area  To prevent burning during urination spray jalen water bottle on labial area during urination  Pat perineal area dry until episiotomy/laceration is healed  Use a jalen-spray bottle or sitz bath as needed. (Sit in 6 inches of warm water, twice a day, 20 minutes)  Continue to use jalen-bottle until bleeding stops    BLEEDING  Soaking 1 pad or more in an hour is considered heavy bleeding.Call your care team immediately to evaluate  Passing large blood clots is concerning, call your care team to evaluate   If you begin feeling faint upon standing, feeling sick to your stomach, have clammy skin, a really fast heart beat, have chills, start feeling confused, dizzy, sleepy or weak, or feel like your going to faint call you care team    HYPERTENSION  If you start experiencing any changes in vision, severe headache or pain in upper abdomen, shortness of breath at rest or chest pain call your care team immediately    URINATION  Urinary frequency and urgency after childbirth is normal   If you feel you are unable to urinate, alert your care team     "  EMOTIONS  During the first few days after birth, you may experience a sense of the \"blues\" which may include impatience, irritability or even crying. These feelings may come and go quickly. If you experience more intense feelings or crying spells; loss of appetite; feelings of hopelessness or loss of control; fear of touching the baby; over concern or no concern at all about the baby; little or no concern about your own appearance/caring for yourself; and/or inability to sleep or excessive sleeping please talk to your care team because we can help    CRYING BABY  Sometimes babies cry. Ask yourself these 4 questions: Is the baby showing feeding cues and may be hungry? Does the baby have a wet or poopy diaper? Is the baby too warm? Is the baby gassy?  Try to comfort baby with the 3 S’s; SWADDLE, SHUSH and SWAY  If you are angry or stressed, put the baby in the bassinet, step away, take some deep breaths, and call your care team for help    PATIENT DISCHARGE EDUCATION INSTRUCTION SHEET    REASONS TO CALL YOUR OBSTETRICIAN  Persistent fever, shaking, chills (Temperature higher than 100.4) may indicate you have an infection  Heavy bleeding: soaking more than 1 pad per hour; Passing clots an egg-sized clot or bigger may mean you have an postpartum hemorrhage  Foul odor from vagina or bad smelling or discolored discharge or blood  Breast infection (Mastitis symptoms); breast pain, chills, fever, redness or red streaks, may feel flu like symptoms  Urinary pain, burning or frequency  Incision that is not healing, increased redness, swelling, tenderness or pain, or any pus from episiotomy or  site may mean you have an infection  Redness, swelling, warmth, or painful to touch in the calf area of your leg may mean you have a blood clot  Severe or intensified depression, thoughts or feelings of wanting to hurt yourself or someone else   Pain in chest, obstructed breathing or shortness of breath (trouble catching your " breath) may mean you are having a postpartum complication. Call your provider immediately   Headache that does not get better, even after taking medicine, a bad headache with vision changes or pain in the upper right area of your belly may mean you have high blood pressure or post birth preeclampsia. Call your provider immediately    HAND WASHING  All family and friends should wash their hands:  Before and after holding the baby  Before feeding the baby  After using the restroom or changing the baby's diaper    WOUND CARE  Ask your physician for additional care instructions. In general:   Incision:  May shower and pat incision dry   Keep the incision clean and dry  There should not be any opening or pus from the incision  Continue to walk at home 3 times a day   Do NOT lift anything heavier than your baby (over 10 pounds)  Encourage family to help participate in care of the  to allow rest and mom time to heal  Episiotomy/Laceration  May use jalen-spray bottle, witch hazel pads and dermaplast spray for comfort  Use jalen-spray bottle after urinating to cleanse perineal area  To prevent burning during urination spray jalen-water bottle on labial area   Pat perineal area dry until episiotomy/laceration is healed  Continue to use jalen-bottle until bleeding stops as needed  If have a 2nd degree laceration or greater, a Sitz bath can offer relief from soreness, burning, and inflammation   Sitz Bath   Sit in 6 inches of warm water and soak laceration as needed until the laceration heals    VAGINAL CARE AND BLEEDING  Nothing inside vagina for 6 weeks:   No sexual intercourse, tampons or douching  Bleeding may continue for 2-4 weeks. Amount and color may vary  Soaking 1 pad or more in an hour for several hours is considered heavy bleeding  Passing large egg sized blood clots can be concerning  If you feel like you have heavy bleeding or are having increasing amount of blood clots call your Obstetrician  "immediately  If you begin feeling faint upon standing, feeling sick to your stomach, have clammy skin, a really fast heartbeat, have chills, start feeling confused, dizzy, sleepy or weak, or feeling like you're going to faint call your Obstetrician immediately    HYPERTENSION   Preeclampsia or gestational hypertension are types of high blood pressure that only pregnant women can get. It is important for you to be aware of symptoms to seek early intervention and treatment. If you have any of these symptoms immediately call your Obstetrician    Vision changes or blurred vision   Severe headache or pain that is unrelieved with medication and will not go away  Persistent pain in upper abdomen or shoulder   Increased swelling of face, feet, or hands  Difficulty breathing or shortness of breath at rest  Urinating less than usual    URINATION AND BOWEL MOVEMENTS  Eating more fiber (bran cereal, fruits, and vegetables) and drinking plenty of fluids will help to avoid constipation  Urinary frequency and urgency after childbirth is normal  If you experience any urinary pain, burning or frequency call your provider    BIRTH CONTROL  It is possible to become pregnant at any time after delivery and while breastfeeding  Plan to discuss a method of birth control with your physician at your post delivery follow up visit    POSTPARTUM BLUES  During the first few days after birth, you may experience a sense of the \"blues\" which may include impatience, irritability or even crying. These feelings come and go quickly. However, as many as 1 in 10 women experience emotional symptoms known as postpartum depression.     POSTPARTUM DEPRESSION    May start as early as the second or third day after delivery or take several weeks or months to develop. Symptoms of \"blues\" are present, but are more intense: Crying spells; loss of appetite; feelings of hopelessness or loss of control; fear of touching the baby; over concern or no concern at all " "about the baby; little or no concern about your own appearance/caring for yourself; and/or inability to sleep or excessive sleeping. Contact your Obstetrician if you are experiencing any of these symptoms     PREVENTING SHAKEN BABY  If you are angry or stressed, PUT THE BABY IN THE CRIB, step away, take some deep breaths, and wait until you are calm to care for the baby. DO NOT SHAKE THE BABY. You are not alone, call a supporter for help.  [facility] 24/7 crisis call line (145-292-5706) or (1-772.747.2399)  You can also text them, text \"ANSWER\" (451140)  "

## 2022-10-27 NOTE — PROGRESS NOTES
0700- report received from NOC RN. POC discussed including feeding intervals, I+O documentation, latch support, lochia changes, ambulation, infant temperature management including STS and layering, weights, VS intervals, and discharge planning. Medications and other comfort measures discussed. Call light in reach.  Discussed supplementing with DBM and watch for infant void prior to planned dc home today. Verbalizes understanding. Infant brought to breast and latch achieved. No further needs at this time. Paced feeding discussed with return demonstration by FOB    1600- discussed continuing supplementation with feeds q3h until infant voids and is feeding longer. Reports infant latching for 6 minutes each time and mother has been expressing and feeding back 2ML colostrum each feeding. Discussed increasing next DBM supplementation to 20ML. External bladder massage and stimulation with cool wipe attempted on infant with no voiding achieved. Next feeding at 1800- verbalizes to call RN for supplementation prior to feed.

## 2022-10-27 NOTE — DISCHARGE PLANNING
Meds-to-Beds: Discharge prescription orders listed below delivered to patient's bedside. RN Mame notified. Patient counseled. Co-payment processed by pharmacy.     Patient declined acetaminophen and docusate as she has at home/ will get OTC.      Current Outpatient Medications   Medication Sig Dispense Refill    ibuprofen (MOTRIN) 800 MG Tab Take 1 Tablet by mouth every 8 hours as needed for Mild Pain or Moderate Pain (cramping). 30 Tablet 2      Nory Landers, PharmD

## 2022-10-27 NOTE — DISCHARGE SUMMARY
Discharge Summary:      Kadi Dykes    Admit Date:   10/25/2022  Discharge Date:  10/27/2022     Admitting diagnosis:  Labor and delivery indication for care or intervention [O75.9]  Encounter for elective induction of labor [Z34.90]  Discharge Diagnosis: Status post vaginal, spontaneous.  Pregnancy Complications: none  Tubal Ligation:  no        History:  Past Medical History:   Diagnosis Date    Patient denies medical problems      OB History    Para Term  AB Living   4 3 3   1 3   SAB IAB Ectopic Molar Multiple Live Births   1       0 3      # Outcome Date GA Lbr Osvaldo/2nd Weight Sex Delivery Anes PTL Lv   4 Term 10/26/22 39w1d / 00:08 3.01 kg (6 lb 10.2 oz) F Vag-Spont EPI N SOLE   3 SAB  9w0d          2 Term 20 40w0d  3.118 kg (6 lb 14 oz) F Vag-Spont EPI  SOLE   1 Term 18 40w0d  2.977 kg (6 lb 9 oz) M Vag-Spont EPI N SOLE        Hydrocodone-acetaminophen  Patient Active Problem List    Diagnosis Date Noted    Postpartum care and examination 10/27/2022    Labor and delivery indication for care or intervention 10/25/2022    Encounter for elective induction of labor 10/25/2022    Obesity affecting pregnancy 2022    Chronic nonintractable headache 2022    Supervision of normal pregnancy in third trimester 2019        Hospital Course:   32 y.o. , now para 3, was admitted with the above mentioned diagnosis, underwent  elective induction of labor , vaginal, spontaneous. Patient postpartum course was unremarkable, with progressive advancement in diet , ambulation and toleration of oral analgesia. Patient without complaints today and desires discharge.      Vitals:    10/26/22 0500 10/26/22 0600 10/26/22 1728 10/27/22 0600   BP: 130/87 123/75 116/78 106/63   Pulse: 73 63 60 61   Resp: 18 18 16 16   Temp: 36.9 °C (98.5 °F) 36.3 °C (97.4 °F) 35.9 °C (96.6 °F) 36.4 °C (97.6 °F)   TempSrc: Temporal Temporal Temporal Temporal   SpO2: 95% 97% 96% 95%   Weight:        Height:           Current Facility-Administered Medications   Medication Dose    lactated ringers infusion      docusate sodium (COLACE) capsule 100 mg  100 mg    ibuprofen (MOTRIN) tablet 800 mg  800 mg    acetaminophen (TYLENOL) tablet 1,000 mg  1,000 mg    PRN oxytocin (PITOCIN) (20 Units/1000 mL) PRN for excessive uterine bleeding - See Admin Instr  125-999 mL/hr    miSOPROStol (CYTOTEC) tablet 600 mcg  600 mcg    NIFEdipine IR (PROCARDIA) capsule 10 mg  10 mg    bisacodyl (DULCOLAX) suppository 10 mg  10 mg    magnesium hydroxide (MILK OF MAGNESIA) suspension 30 mL  30 mL    prenatal plus vitamin (STUARTNATAL 1+1) 27-1 MG tablet 1 Tablet  1 Tablet    ondansetron (ZOFRAN) syringe/vial injection 4 mg  4 mg    ropivacaine 0.2 % (NAROPIN) injection      LR infusion      oxytocin (PITOCIN) infusion (for post delivery)  125 mL/hr    oxytocin (Pitocin) 0.02 Units/mL LR (induction of labor)  1 evangelist-units/min       Exam:  Breast Exam: negative  Abdomen: Abdomen soft, non-tender. BS normal. No masses,  No organomegaly  Fundus Non Tender: yes  Incision: none  Perineum: perineum intact  Extremity: extremities, peripheral pulses and reflexes normal     Labs:  Recent Labs     10/25/22  1030 10/26/22  1230   WBC 7.8 8.9   RBC 4.77 4.08*   HEMOGLOBIN 14.1 12.4   HEMATOCRIT 41.0 35.5*   MCV 86.0 87.0   MCH 29.6 30.4   MCHC 34.4 34.9   RDW 42.4 42.4   PLATELETCT 191 161*   MPV 12.2 11.5        Activity:   Discharge to home  Pelvic Rest x 6 weeks    Assessment:  normal postpartum course and good candidate for condoms  Discharge Assessment: Taking adequate diet and fluids, no heavy bleeding or foul discharge, breasts without evidence of infection/concerns, voiding without difficulty       Follow up: [facility] in 5 weeks for vaginal      Discharge Meds:   Current Outpatient Medications   Medication Sig Dispense Refill    acetaminophen (TYLENOL) 500 MG Tab Take 2 Tablets by mouth every 6 hours as needed for Mild  Pain or Moderate Pain. 30 Tablet 2    docusate sodium 100 MG Cap Take 100 mg by mouth 2 times a day as needed for Constipation. 60 Capsule 2    ibuprofen (MOTRIN) 800 MG Tab Take 1 Tablet by mouth every 8 hours as needed for Mild Pain or Moderate Pain (cramping). 30 Tablet 2       Melina Kimball C.N.M.  Attending MD: Dr. Naidu

## 2022-10-27 NOTE — CARE PLAN
The patient is Stable - Low risk of patient condition declining or worsening    Shift Goals  Clinical Goals: lochia WDL  Patient Goals: pain WDL  Family Goals: bond    Progress made toward(s) clinical / shift goals:    Problem: Altered Physiologic Condition  Goal: Patient physiologically stable as evidenced by normal lochia, palpable uterine involution and vitals within normal limits  Outcome: Progressing     Problem: Infection - Postpartum  Goal: Postpartum patient will be free of signs and symptoms of infection  Outcome: Progressing       Patient is not progressing towards the following goals:

## 2022-10-27 NOTE — CARE PLAN
The patient is Stable - Low risk of patient condition declining or worsening    Shift Goals  Clinical Goals: Pain management  Patient Goals: Rest  Family Goals: support    Progress made toward(s) clinical / shift goals:  Pain being managed using pharmacologic and non pharmacologic interventions    Patient is not progressing towards the following goals:

## 2022-10-27 NOTE — PROGRESS NOTES
Bedside report received. VSS. Patient assessment completed. Reviewed POC for the night and answered all questions. Call light within reach.

## 2022-10-28 VITALS
RESPIRATION RATE: 18 BRPM | SYSTOLIC BLOOD PRESSURE: 111 MMHG | HEART RATE: 60 BPM | HEIGHT: 62 IN | WEIGHT: 201 LBS | TEMPERATURE: 97.4 F | OXYGEN SATURATION: 97 % | BODY MASS INDEX: 36.99 KG/M2 | DIASTOLIC BLOOD PRESSURE: 60 MMHG

## 2022-10-28 PROCEDURE — 700102 HCHG RX REV CODE 250 W/ 637 OVERRIDE(OP)

## 2022-10-28 PROCEDURE — A9270 NON-COVERED ITEM OR SERVICE: HCPCS

## 2022-10-28 RX ADMIN — IBUPROFEN 800 MG: 800 TABLET, FILM COATED ORAL at 04:01

## 2022-10-28 RX ADMIN — ACETAMINOPHEN 1000 MG: 500 TABLET ORAL at 09:15

## 2022-10-28 RX ADMIN — PRENATAL WITH FERROUS FUM AND FOLIC ACID 1 TABLET: 3080; 920; 120; 400; 22; 1.84; 3; 20; 10; 1; 12; 200; 27; 25; 2 TABLET ORAL at 09:15

## 2022-10-28 ASSESSMENT — PAIN DESCRIPTION - PAIN TYPE
TYPE: ACUTE PAIN

## 2022-10-28 NOTE — CARE PLAN
The patient is Stable - Low risk of patient condition declining or worsening    Shift Goals  Clinical Goals: Patient will maintain stable VS; Lochia WDL; Pain WDL  Patient Goals: pain WDL  Family Goals: bond    Progress made toward(s) clinical / shift goals:    Problem: Pain - Standard  Goal: Alleviation of pain or a reduction in pain to the patient’s comfort goal  Outcome: Met     Problem: Knowledge Deficit - Standard  Goal: Patient and family/care givers will demonstrate understanding of plan of care, disease process/condition, diagnostic tests and medications  Outcome: Met     Problem: Knowledge Deficit - Postpartum  Goal: Patient will verbalize and demonstrate understanding of self and infant care  Outcome: Met     Problem: Psychosocial - Postpartum  Goal: Patient will verbalize and demonstrate effective bonding and parenting behavior  Outcome: Met     Problem: Altered Physiologic Condition  Goal: Patient physiologically stable as evidenced by normal lochia, palpable uterine involution and vitals within normal limits  Outcome: Met     Problem: Infection - Postpartum  Goal: Postpartum patient will be free of signs and symptoms of infection  Outcome: Met       Patient is not progressing towards the following goals:

## 2022-10-28 NOTE — LACTATION NOTE
Lactation Follow-up Visit:    PHILIPP reported she is breastfeeding without concern and is pumping afterwards and feeding back to baby her expressed breast milk.  MOB was encouraged to follow up with a lactation consultant post discharge for evaluation of infant's ability to transfer breast milk sufficiently from her breasts so that pumping can be stopped and baby can be breast fed exclusively.  MOB/baby's discharge order was cancelled yesterday and above feeding plan initiated due to no void for infant being documented on I&O log since birth per NOC RN, Anastacia Julian.  Latch assistance was offered, but PHILIPP declined.  She denied pain and tissue damage to her breasts with latch.    PHILIPP reported she has a personal breast pump for home use.    MOB stated she was provided with a written breastfeeding resource list by previous .    PHILIPP denied having any lactation questions and/or concerns at this time and is preparing to discharge home soon.

## 2022-10-28 NOTE — CARE PLAN
Problem: Pain - Standard  Goal: Alleviation of pain or a reduction in pain to the patient’s comfort goal  Outcome: Progressing     Problem: Altered Physiologic Condition  Goal: Patient physiologically stable as evidenced by normal lochia, palpable uterine involution and vitals within normal limits  Outcome: Progressing     Problem: Infection - Postpartum  Goal: Postpartum patient will be free of signs and symptoms of infection  Outcome: Progressing     The patient is Stable - Low risk of patient condition declining or worsening    Shift Goals  Clinical Goals: pain control, lochia wdl  Patient Goals: pain WDL  Family Goals: bond    Progress made toward(s) clinical / shift goals:  Pain well controlled with non-narcotic analgesia, pt is able to ambulate and care for self and infant without difficulty. Lochia remains light without clots expressed, performing jalen care and pad changes independently. No s/s infection noted on assessment, remains afebrile.     Patient is not progressing towards the following goals: NA

## 2022-10-28 NOTE — PROGRESS NOTES
0800 Assessment completed. Lochia light, fundus firm. Plan of care reviewed. Declines pain intervention at this time, will call if pain intervention needed.    0915 Discharge instructions and education reviewed with patient, verbalized understanding, papers signed.   0940 Left facility escorted by staff.

## 2022-10-28 NOTE — DISCHARGE SUMMARY
Discharge Summary:     Date of Admission: 10/25/2022  Date of Discharge: 10/28/22      Admitting diagnosis:    1. Pregnancy @ 39w1d  2. Elective induction of labor     Discharge Diagnosis:   1. Status post vaginal, spontaneous.    Past Medical History:   Diagnosis Date    Patient denies medical problems      OB History    Para Term  AB Living   4 3 3   1 3   SAB IAB Ectopic Molar Multiple Live Births   1       0 3      # Outcome Date GA Lbr Osvaldo/2nd Weight Sex Delivery Anes PTL Lv   4 Term 10/26/22 39w1d / 00:08 3.01 kg (6 lb 10.2 oz) F Vag-Spont EPI N SOLE   3 SAB  9w0d          2 Term 20 40w0d  3.118 kg (6 lb 14 oz) F Vag-Spont EPI  SOLE   1 Term 18 40w0d  2.977 kg (6 lb 9 oz) M Vag-Spont EPI N SOLE     Past Surgical History:   Procedure Laterality Date    ABDOMINAL EXPLORATION  2021    gallbladder removed      Hydrocodone-acetaminophen    Patient Active Problem List   Diagnosis    Supervision of normal pregnancy in third trimester    Chronic nonintractable headache    Obesity affecting pregnancy    Labor and delivery indication for care or intervention    Encounter for elective induction of labor    Postpartum care and examination     Hospital Course:   Pt is a 32 y.o. now  who presented on 10/25/2022 for induction of labor. Labor induction performed with pitocin and AROM. Epidural anesthesia was utilized with good effect on pain. Single viable female infant was delivered via  at 0053 on 10/26/22. Apgars 8, 8 at 1 and 5 minutes respectively.  ml.     Postpartum course notable for early ambulation, well managed pain, tolerance of diet, spontaneous voiding, and appropriate feeding of infant. She has remained afebrile and blood pressure has been well controlled. Patient was going to be discharged yesterday but stayed over as infant needed to be monitored overnight. Mother remains stable with good pain management and minimal vaginal bleeding. All maternal questions and  concerns addressed    Physical Exam:  Temp:  [36.3 °C (97.4 °F)-36.5 °C (97.7 °F)] 36.3 °C (97.4 °F)  Pulse:  [60-70] 60  Resp:  [18] 18  BP: (111-125)/(60-83) 111/60  SpO2:  [96 %-97 %] 97 %  Physical Exam  General: well appearing, no apparent distress  Abdomen: soft, nontender, nondistended  Fundus: firm at level below umbilicus  Perineum: Deferred  Extremities: symmetric, no peripheral edema, calves nontender    Current Facility-Administered Medications   Medication Dose    lactated ringers infusion      docusate sodium (COLACE) capsule 100 mg  100 mg    ibuprofen (MOTRIN) tablet 800 mg  800 mg    acetaminophen (TYLENOL) tablet 1,000 mg  1,000 mg    PRN oxytocin (PITOCIN) (20 Units/1000 mL) PRN for excessive uterine bleeding - See Admin Instr  125-999 mL/hr    miSOPROStol (CYTOTEC) tablet 600 mcg  600 mcg    NIFEdipine IR (PROCARDIA) capsule 10 mg  10 mg    bisacodyl (DULCOLAX) suppository 10 mg  10 mg    magnesium hydroxide (MILK OF MAGNESIA) suspension 30 mL  30 mL    prenatal plus vitamin (STUARTNATAL 1+1) 27-1 MG tablet 1 Tablet  1 Tablet    ondansetron (ZOFRAN) syringe/vial injection 4 mg  4 mg    oxytocin (PITOCIN) infusion (for post delivery)  125 mL/hr       Recent Labs     10/25/22  1030 10/26/22  1230   WBC 7.8 8.9   RBC 4.77 4.08*   HEMOGLOBIN 14.1 12.4   HEMATOCRIT 41.0 35.5*   MCV 86.0 87.0   MCH 29.6 30.4   MCHC 34.4 34.9   RDW 42.4 42.4   PLATELETCT 191 161*   MPV 12.2 11.5     Activity/ Discharge Instructions::   Discharge to home  Pelvic Rest x 6 weeks  No heavy lifting x4 weeks  Call or come to ED for: heavy vaginal bleeding, fever >100.4, severe abdominal pain, severe headache, chest pain, shortness of breath,  N/V, incisional drainage, or other concerns.     Follow up:  [facility] in 5 weeks for vaginal delivery  Discharge Meds:   Current Outpatient Medications   Medication Sig Dispense Refill    acetaminophen (TYLENOL) 500 MG Tab Take 2 Tablets by mouth every 6 hours as needed for  Mild Pain or Moderate Pain. 30 Tablet 2    docusate sodium 100 MG Cap Take 100 mg by mouth 2 times a day as needed for Constipation. 60 Capsule 2    ibuprofen (MOTRIN) 800 MG Tab Take 1 Tablet by mouth every 8 hours as needed for Mild Pain or Moderate Pain (cramping). 30 Tablet 2     Nakita Adams M.D.PGY-1

## 2022-10-28 NOTE — PROGRESS NOTES
Double electric breast pump initiated due to infant using donor breastmilk for supplementation per MD order for low output (no void observed in greater than 24hrs). Education provided to pt regarding pumping every 3hrs, settings of pump: initial speed setting at 80CPM for 2 minutes then decrease to 60 CPM for remainder of pumping session, suction set per pt comfort. Pt instructed to pump for 15minutes total every 3hrs and to perform hand expression/breast massage after each pumping session to maximize milk production. Reviewed cleaning of parts and breastmilk storage, questions answered. Handouts given to pt for reference.

## 2022-10-31 ENCOUNTER — APPOINTMENT (OUTPATIENT)
Dept: OBGYN | Facility: CLINIC | Age: 32
End: 2022-10-31
Payer: COMMERCIAL

## 2022-11-10 ENCOUNTER — OFFICE VISIT (OUTPATIENT)
Dept: NEUROLOGY | Facility: MEDICAL CENTER | Age: 32
End: 2022-11-10
Attending: NURSE PRACTITIONER
Payer: COMMERCIAL

## 2022-11-10 VITALS
BODY MASS INDEX: 34.81 KG/M2 | HEIGHT: 62 IN | TEMPERATURE: 96.1 F | WEIGHT: 189.15 LBS | SYSTOLIC BLOOD PRESSURE: 116 MMHG | OXYGEN SATURATION: 96 % | HEART RATE: 83 BPM | DIASTOLIC BLOOD PRESSURE: 64 MMHG

## 2022-11-10 DIAGNOSIS — G43.109 MIGRAINE WITH AURA AND WITHOUT STATUS MIGRAINOSUS, NOT INTRACTABLE: ICD-10-CM

## 2022-11-10 PROCEDURE — 99214 OFFICE O/P EST MOD 30 MIN: CPT | Performed by: NURSE PRACTITIONER

## 2022-11-10 PROCEDURE — 99211 OFF/OP EST MAY X REQ PHY/QHP: CPT | Performed by: NURSE PRACTITIONER

## 2022-11-10 ASSESSMENT — ENCOUNTER SYMPTOMS
DEPRESSION: 0
SENSORY CHANGE: 0
TINGLING: 1
BLURRED VISION: 1
FEVER: 0
NECK PAIN: 1
NERVOUS/ANXIOUS: 1
SINUS PAIN: 0
COUGH: 0
HEADACHES: 1
FOCAL WEAKNESS: 0
DIZZINESS: 1
PHOTOPHOBIA: 1
PALPITATIONS: 0
DOUBLE VISION: 0
WEAKNESS: 0
VOMITING: 0
NAUSEA: 1

## 2022-11-10 ASSESSMENT — FIBROSIS 4 INDEX: FIB4 SCORE: 1.64

## 2022-11-10 NOTE — PROGRESS NOTES
Subjective       HPI    Kadi Dykes is a 32 y.o. female who presents as a new patient for chronic migraines.      She was referred by Dr.Bailey Szymanski.    She had a baby girl on 10/26/2022, she is breast feeding.     Age at Onset: 15-16  Triggers  Lack of sleep           Triggered by Activity? Yes  Alleviating factors increases water, doesn't really help  Meds tried and result:         Preventative:  Medication Dose/length of treatment Result/side effects   none                                                     Abortive:   Medication Dose/length of treatment Result/side effects   tylenol  Didn't help                                             Hormones: none  Caffeine use 1 cup per day  OTC medications--frequency: none  How many days per month:    Missed days of school/work in past 6 months:  not working outside of the home, has 3 children at home, 4,2,   Characteristics:                   A) Location:  right temple/frontal             B)Duration: 6-12 hours             C)Intensity:  6-7/10              D) Quality of pain:  throbbing                               Is it pulsating:  yes              E) Associated Symptoms Blurry vision & dizziness.   N&V:  Sometimes nausea  Photo/phonophobia:  Both  Aura: Kaleidoscope  Prodrome:  none   ER/Urgent care visits in past 6 months:  none   Sleep schedule:  not on a regular schedule, she just had a baby, she is sleeping about 6 hours.    RED FLAGS:   none      Review of Systems   Constitutional:  Negative for fever.   HENT:  Negative for congestion, nosebleeds and sinus pain.    Eyes:  Positive for blurred vision and photophobia. Negative for double vision.   Respiratory:  Negative for cough.    Cardiovascular:  Negative for chest pain and palpitations.   Gastrointestinal:  Positive for nausea. Negative for vomiting.   Musculoskeletal:  Positive for neck pain.   Neurological:  Positive for dizziness, tingling and headaches. Negative for sensory  "change, focal weakness and weakness.        Right arm tingling    Psychiatric/Behavioral:  Negative for depression. The patient is nervous/anxious.         Current Outpatient Medications on File Prior to Visit   Medication Sig Dispense Refill    acetaminophen (TYLENOL) 500 MG Tab Take 2 Tablets by mouth every 6 hours as needed for Mild Pain or Moderate Pain. 30 Tablet 2    docusate sodium 100 MG Cap Take 100 mg by mouth 2 times a day as needed for Constipation. 60 Capsule 2    ibuprofen (MOTRIN) 800 MG Tab Take 1 Tablet by mouth every 8 hours as needed for Mild Pain or Moderate Pain (cramping). 30 Tablet 2    Prenatal MV-Min-Fe Fum-FA-DHA (PRENATAL 1 PO) Take  by mouth.       No current facility-administered medications on file prior to visit.     Past Medical History:   Diagnosis Date    Patient denies medical problems         Social History     Tobacco Use    Smoking status: Never    Smokeless tobacco: Never   Vaping Use    Vaping Use: Never used   Substance Use Topics    Alcohol use: Not Currently    Drug use: Never      Objective     /64 (BP Location: Right arm, Patient Position: Sitting, BP Cuff Size: Adult)   Pulse 83   Temp (!) 35.6 °C (96.1 °F) (Temporal)   Ht 1.575 m (5' 2\")   Wt 85.8 kg (189 lb 2.5 oz)   LMP 01/25/2022   SpO2 96%   BMI 34.60 kg/m²               Assessment & Plan       1. Migraine with aura and without status migrainosus, not intractable      Nurtec is safe for breastfeeding.  Take 1 under the tongue up to 1 time per day, no more than 8 per month.  Occasionally, people will notice they get tired with this, you should take the first time when another adult is in the house.  (4 tablets given today as samples)      Will hold off on prescription preventatives for now as she is breast feeding       I recommend the following over the counter supplements every night at bedtime:  Start magnesium oxide 400mg gel cap by mouth every night, may take extra dose if needed for headache (over " the counter), hold for diarrhea         Start Riboflavin (Vitamin B2) 400mg by mouth every night (over the counter),may turn urine bright yellow         Start COQ 10, take 300mg every night. (over the counter)          Attempt to go to bed and get up at the same time every night           Eat meals on regular basis            Stay hydrated.             Aerobic exercise 30 minutes daily             Avoid aged or smoked foods, avoid processed foods, red wine, aged cheese              Keep headache diary, include foods that you may have eaten.             Avoid overusing over the counter medications:  Do not take more than 500mg acetaminophen (tylenol), more than 4 times weekly, more frequent or larger doses are associated with medication overuse headache.     I spent 39 minutes caring for patient, my time includes reviewing the chart, obtaining current HPI, exam, discussion of disease process, ordering testing and medications and counseling.      I counseled patient on migraine triggers, lifestyle changes, medication overuse, supplements and medication side effects.      Follow up in 1 year (when she is done breastfeeding)

## 2022-11-10 NOTE — PATIENT INSTRUCTIONS
Nurtec is safe for breastfeeding.  Take 1 under the tongue up to 1 time per day, no more than 8 per month.  Occasionally, people will notice they get tired with this, you should take the first time when another adult is in the house.        IF ok with pediatrician   I recommend the following over the counter supplements every night at bedtime:  Start magnesium oxide 400mg gel cap by mouth every night, may take extra dose if needed for headache (over the counter), hold for diarrhea         Start Riboflavin (Vitamin B2) 400mg by mouth every night (over the counter),may turn urine bright yellow         Start COQ 10, take 300mg every night. (over the counter)          Attempt to go to bed and get up at the same time every night           Eat meals on regular basis            Stay hydrated.             Aerobic exercise 30 minutes daily             Avoid aged or smoked foods, avoid processed foods, red wine, aged cheese              Keep headache diary, include foods that you may have eaten.             Avoid overusing over the counter medications:  Do not take more than 500mg acetaminophen (tylenol), more than 4 times weekly, more frequent or larger doses are associated with medication overuse headache.

## 2022-11-22 ENCOUNTER — APPOINTMENT (OUTPATIENT)
Dept: OBGYN | Facility: CLINIC | Age: 32
End: 2022-11-22
Payer: COMMERCIAL

## 2022-12-04 ASSESSMENT — EDINBURGH POSTNATAL DEPRESSION SCALE (EPDS)
I HAVE FELT SAD OR MISERABLE: NO, NOT AT ALL
I HAVE BEEN SO UNHAPPY THAT I HAVE HAD DIFFICULTY SLEEPING: NOT AT ALL
I HAVE BEEN ANXIOUS OR WORRIED FOR NO GOOD REASON: NO, NOT AT ALL
TOTAL SCORE: 1
I HAVE BEEN SO UNHAPPY THAT I HAVE BEEN CRYING: NO, NEVER
THINGS HAVE BEEN GETTING ON TOP OF ME: NO, MOST OF THE TIME I HAVE COPED QUITE WELL
I HAVE FELT SCARED OR PANICKY FOR NO GOOD REASON: NO, NOT AT ALL
I HAVE BLAMED MYSELF UNNECESSARILY WHEN THINGS WENT WRONG: NO, NEVER
I HAVE BEEN ABLE TO LAUGH AND SEE THE FUNNY SIDE OF THINGS: AS MUCH AS I ALWAYS COULD
THE THOUGHT OF HARMING MYSELF HAS OCCURRED TO ME: NEVER
I HAVE LOOKED FORWARD WITH ENJOYMENT TO THINGS: AS MUCH AS I EVER DID

## 2022-12-05 ENCOUNTER — POST PARTUM (OUTPATIENT)
Dept: OBGYN | Facility: CLINIC | Age: 32
End: 2022-12-05
Payer: COMMERCIAL

## 2022-12-05 VITALS — WEIGHT: 189 LBS | BODY MASS INDEX: 34.57 KG/M2 | SYSTOLIC BLOOD PRESSURE: 120 MMHG | DIASTOLIC BLOOD PRESSURE: 78 MMHG

## 2022-12-05 PROCEDURE — 0503F POSTPARTUM CARE VISIT: CPT | Performed by: OBSTETRICS & GYNECOLOGY

## 2022-12-05 ASSESSMENT — EDINBURGH POSTNATAL DEPRESSION SCALE (EPDS)
TOTAL SCORE: 1
I HAVE FELT SCARED OR PANICKY FOR NO GOOD REASON: NO, NOT AT ALL
I HAVE BEEN SO UNHAPPY THAT I HAVE BEEN CRYING: NO, NEVER
I HAVE BEEN SO UNHAPPY THAT I HAVE HAD DIFFICULTY SLEEPING: NOT AT ALL
I HAVE BLAMED MYSELF UNNECESSARILY WHEN THINGS WENT WRONG: NO, NEVER
I HAVE FELT SAD OR MISERABLE: NO, NOT AT ALL
THINGS HAVE BEEN GETTING ON TOP OF ME: NO, MOST OF THE TIME I HAVE COPED QUITE WELL
I HAVE LOOKED FORWARD WITH ENJOYMENT TO THINGS: AS MUCH AS I EVER DID
THE THOUGHT OF HARMING MYSELF HAS OCCURRED TO ME: NEVER
I HAVE BEEN ANXIOUS OR WORRIED FOR NO GOOD REASON: NO, NOT AT ALL
I HAVE BEEN ABLE TO LAUGH AND SEE THE FUNNY SIDE OF THINGS: AS MUCH AS I ALWAYS COULD

## 2022-12-05 ASSESSMENT — FIBROSIS 4 INDEX: FIB4 SCORE: 1.64

## 2022-12-05 NOTE — PROGRESS NOTES
Postpartum;    Gisela Dykes is 32 y.o. female  status post , doing well today. Currently nursing without difficulty    Physical examination;    Breast examination-no dominant masses, no skin retraction, no axillary adenopathy, no evidence of mastitis    Pelvic examination;  External genitalia-no visible lesions  Vagina-no discharge or blood  Cervix-no gross lesions  Uterus-normal size and shape, nontender  Adnexa without mass or tenderness     Abdomen-nondistended positive bowel sounds soft nontender without masses or hepatosplenomegaly      Impression;  Normal postpartum    Plan;  Birth control patient's  is planning on getting vasectomy  Annual-1 year

## 2023-01-27 ENCOUNTER — TELEPHONE (OUTPATIENT)
Dept: NEUROLOGY | Facility: MEDICAL CENTER | Age: 33
End: 2023-01-27
Payer: COMMERCIAL

## 2023-01-27 NOTE — TELEPHONE ENCOUNTER
nurtec 75mg, 16/30; pa submitted in [facility]: [id number] waiting on plan response, current pa exps 2/10/23;

## 2023-04-24 ENCOUNTER — APPOINTMENT (OUTPATIENT)
Dept: RADIOLOGY | Facility: IMAGING CENTER | Age: 33
End: 2023-04-24
Attending: PHYSICIAN ASSISTANT
Payer: COMMERCIAL

## 2023-04-24 ENCOUNTER — OFFICE VISIT (OUTPATIENT)
Dept: URGENT CARE | Facility: PHYSICIAN GROUP | Age: 33
End: 2023-04-24
Payer: COMMERCIAL

## 2023-04-24 VITALS
SYSTOLIC BLOOD PRESSURE: 118 MMHG | DIASTOLIC BLOOD PRESSURE: 78 MMHG | BODY MASS INDEX: 35.87 KG/M2 | WEIGHT: 190 LBS | HEIGHT: 61 IN | TEMPERATURE: 98 F | OXYGEN SATURATION: 95 % | HEART RATE: 92 BPM | RESPIRATION RATE: 16 BRPM

## 2023-04-24 DIAGNOSIS — M79.672 LEFT FOOT PAIN: ICD-10-CM

## 2023-04-24 DIAGNOSIS — S96.912A STRAIN OF LEFT FOOT, INITIAL ENCOUNTER: ICD-10-CM

## 2023-04-24 PROCEDURE — 73630 X-RAY EXAM OF FOOT: CPT | Mod: TC,LT | Performed by: RADIOLOGY

## 2023-04-24 PROCEDURE — 99213 OFFICE O/P EST LOW 20 MIN: CPT | Performed by: PHYSICIAN ASSISTANT

## 2023-04-24 ASSESSMENT — FIBROSIS 4 INDEX: FIB4 SCORE: 1.64

## 2023-04-25 NOTE — PROGRESS NOTES
Subjective:   Gisela Dykes is a 32 y.o. female who presents today with   Chief Complaint   Patient presents with    Foot Injury     Left foot injury, pt fell. Happened today      Foot Problem  This is a new problem. The current episode started today. The problem occurs constantly. The problem has been unchanged. She has tried NSAIDs for the symptoms. The treatment provided mild relief.     Patient states she was sitting on her foot and did not realize her foot was asleep when she was braiding one of her children's hair and when she got up she tripped over her foot injuring her foot and hearing a crack and pop.    PMH:  has a past medical history of Patient denies medical problems.  MEDS:   Current Outpatient Medications:     Rimegepant Sulfate 75 MG TABLET DISPERSIBLE, Take 1 Tablet by mouth 1 time a day as needed (migraine)., Disp: 8 Tablet, Rfl: 11    acetaminophen (TYLENOL) 500 MG Tab, Take 2 Tablets by mouth every 6 hours as needed for Mild Pain or Moderate Pain., Disp: 30 Tablet, Rfl: 2    docusate sodium 100 MG Cap, Take 100 mg by mouth 2 times a day as needed for Constipation., Disp: 60 Capsule, Rfl: 2    ibuprofen (MOTRIN) 800 MG Tab, Take 1 Tablet by mouth every 8 hours as needed for Mild Pain or Moderate Pain (cramping)., Disp: 30 Tablet, Rfl: 2    Prenatal MV-Min-Fe Fum-FA-DHA (PRENATAL 1 PO), Take  by mouth. (Patient not taking: Reported on 4/24/2023), Disp: , Rfl:   ALLERGIES:   Allergies   Allergen Reactions    Hydrocodone-Acetaminophen      Other reaction(s): GI Intolerance     SURGHX:   Past Surgical History:   Procedure Laterality Date    ABDOMINAL EXPLORATION  11/2021    gallbladder removed      SOCHX:  reports that she has never smoked. She has never used smokeless tobacco. She reports that she does not currently use alcohol. She reports that she does not use drugs.  FH: Reviewed with patient, not pertinent to this visit.     Review of Systems   Musculoskeletal:         Left foot  "pain       Objective:   /78 (BP Location: Right arm, Patient Position: Sitting, BP Cuff Size: Adult)   Pulse 92   Temp 36.7 °C (98 °F) (Temporal)   Resp 16   Ht 1.549 m (5' 1\")   Wt 86.2 kg (190 lb) Comment: per pt  SpO2 95%   BMI 35.90 kg/m²   Physical Exam  Vitals and nursing note reviewed.   Constitutional:       General: She is not in acute distress.     Appearance: Normal appearance. She is well-developed. She is not ill-appearing or toxic-appearing.   HENT:      Head: Normocephalic and atraumatic.      Right Ear: Hearing normal.      Left Ear: Hearing normal.   Cardiovascular:      Rate and Rhythm: Normal rate.   Pulmonary:      Effort: Pulmonary effort is normal.   Musculoskeletal:      Left ankle:      Left Achilles Tendon: Normal.        Feet:       Comments: Neurovascular intact distally.  Patient does have some swelling to the dorsum and lateral aspect of the left foot.  Tenderness to palpation to the dorsal area of the left foot.  No tenderness to the lateral malleolus.  Limited range of motion with plantarflexion and dorsiflexion.   Skin:     General: Skin is warm and dry.   Neurological:      Mental Status: She is alert.      Coordination: Coordination normal.   Psychiatric:         Mood and Affect: Mood normal.   Ambulating with wheelchair today.  Unable to weight-bear.    DX FOOT  FINDINGS:  The alignment is grossly normal.  There is no evidence of displaced fracture or dislocation.  There is no focal soft tissue swelling.        IMPRESSION:     Normal foot series.    Assessment/Plan:   Assessment    1. Strain of left foot, initial encounter    2. Left foot pain  - DX-FOOT-COMPLETE 3+ LEFT; Future  Patient's symptoms and presentation are consistent with left foot strain.  Patient provided with walking boot today which helped her symptoms.  Recommend rest, ice, compression and elevation.  Over-the-counter medication per 's instructions.  Follow-up with orthopedic specialty if " symptoms continue to persist for the next week to 2 weeks.    Differential diagnosis, natural history, supportive care, and indications for immediate follow-up discussed.   Patient given instructions and understanding of medications and treatment.    If not improving in 3-5 days, F/U with PCP or return to UC if symptoms worsen.    Patient agreeable to plan.      Please note that this dictation was created using voice recognition software. I have made every reasonable attempt to correct obvious errors, but I expect that there are errors of grammar and possibly content that I did not discover before finalizing the note.    Adilson Giraldo PA-C

## 2023-10-10 ENCOUNTER — HOSPITAL ENCOUNTER (OUTPATIENT)
Dept: LAB | Facility: MEDICAL CENTER | Age: 33
End: 2023-10-10
Attending: NURSE PRACTITIONER
Payer: COMMERCIAL

## 2023-10-10 LAB
ALBUMIN SERPL BCP-MCNC: 4.2 G/DL (ref 3.2–4.9)
ALBUMIN/GLOB SERPL: 1.4 G/DL
ALP SERPL-CCNC: 65 U/L (ref 30–99)
ALT SERPL-CCNC: 37 U/L (ref 2–50)
ANION GAP SERPL CALC-SCNC: 10 MMOL/L (ref 7–16)
AST SERPL-CCNC: 27 U/L (ref 12–45)
BASOPHILS # BLD AUTO: 0.8 % (ref 0–1.8)
BASOPHILS # BLD: 0.04 K/UL (ref 0–0.12)
BILIRUB SERPL-MCNC: 0.4 MG/DL (ref 0.1–1.5)
BUN SERPL-MCNC: 9 MG/DL (ref 8–22)
CALCIUM ALBUM COR SERPL-MCNC: 9.4 MG/DL (ref 8.5–10.5)
CALCIUM SERPL-MCNC: 9.6 MG/DL (ref 8.5–10.5)
CHLORIDE SERPL-SCNC: 106 MMOL/L (ref 96–112)
CHOLEST SERPL-MCNC: 156 MG/DL (ref 100–199)
CO2 SERPL-SCNC: 25 MMOL/L (ref 20–33)
CREAT SERPL-MCNC: 0.34 MG/DL (ref 0.5–1.4)
EOSINOPHIL # BLD AUTO: 0.09 K/UL (ref 0–0.51)
EOSINOPHIL NFR BLD: 1.7 % (ref 0–6.9)
ERYTHROCYTE [DISTWIDTH] IN BLOOD BY AUTOMATED COUNT: 39.3 FL (ref 35.9–50)
EST. AVERAGE GLUCOSE BLD GHB EST-MCNC: 108 MG/DL
FASTING STATUS PATIENT QL REPORTED: NORMAL
GFR SERPLBLD CREATININE-BSD FMLA CKD-EPI: 140 ML/MIN/1.73 M 2
GLOBULIN SER CALC-MCNC: 3.1 G/DL (ref 1.9–3.5)
GLUCOSE SERPL-MCNC: 102 MG/DL (ref 65–99)
HBA1C MFR BLD: 5.4 % (ref 4–5.6)
HCT VFR BLD AUTO: 44.2 % (ref 37–47)
HDLC SERPL-MCNC: 35 MG/DL
HGB BLD-MCNC: 14.9 G/DL (ref 12–16)
IMM GRANULOCYTES # BLD AUTO: 0.01 K/UL (ref 0–0.11)
IMM GRANULOCYTES NFR BLD AUTO: 0.2 % (ref 0–0.9)
LDLC SERPL CALC-MCNC: 76 MG/DL
LYMPHOCYTES # BLD AUTO: 1.87 K/UL (ref 1–4.8)
LYMPHOCYTES NFR BLD: 35.1 % (ref 22–41)
MCH RBC QN AUTO: 28.2 PG (ref 27–33)
MCHC RBC AUTO-ENTMCNC: 33.7 G/DL (ref 32.2–35.5)
MCV RBC AUTO: 83.7 FL (ref 81.4–97.8)
MONOCYTES # BLD AUTO: 0.38 K/UL (ref 0–0.85)
MONOCYTES NFR BLD AUTO: 7.1 % (ref 0–13.4)
NEUTROPHILS # BLD AUTO: 2.94 K/UL (ref 1.82–7.42)
NEUTROPHILS NFR BLD: 55.1 % (ref 44–72)
NRBC # BLD AUTO: 0 K/UL
NRBC BLD-RTO: 0 /100 WBC (ref 0–0.2)
PLATELET # BLD AUTO: 283 K/UL (ref 164–446)
PMV BLD AUTO: 11.3 FL (ref 9–12.9)
POTASSIUM SERPL-SCNC: 4.3 MMOL/L (ref 3.6–5.5)
PROT SERPL-MCNC: 7.3 G/DL (ref 6–8.2)
RBC # BLD AUTO: 5.28 M/UL (ref 4.2–5.4)
SODIUM SERPL-SCNC: 141 MMOL/L (ref 135–145)
TRIGL SERPL-MCNC: 226 MG/DL (ref 0–149)
WBC # BLD AUTO: 5.3 K/UL (ref 4.8–10.8)

## 2023-10-10 PROCEDURE — 80061 LIPID PANEL: CPT

## 2023-10-10 PROCEDURE — 36415 COLL VENOUS BLD VENIPUNCTURE: CPT

## 2023-10-10 PROCEDURE — 84443 ASSAY THYROID STIM HORMONE: CPT

## 2023-10-10 PROCEDURE — 85025 COMPLETE CBC W/AUTO DIFF WBC: CPT

## 2023-10-10 PROCEDURE — 82652 VIT D 1 25-DIHYDROXY: CPT

## 2023-10-10 PROCEDURE — 80053 COMPREHEN METABOLIC PANEL: CPT

## 2023-10-10 PROCEDURE — 83036 HEMOGLOBIN GLYCOSYLATED A1C: CPT

## 2023-10-11 LAB — TSH SERPL DL<=0.005 MIU/L-ACNC: 0.95 UIU/ML (ref 0.38–5.33)

## 2023-10-12 LAB — 1,25(OH)2D3 SERPL-MCNC: 49.1 PG/ML (ref 19.9–79.3)

## 2023-11-01 ENCOUNTER — TELEPHONE (OUTPATIENT)
Dept: PHARMACY | Facility: MEDICAL CENTER | Age: 33
End: 2023-11-01
Payer: COMMERCIAL

## 2023-11-01 ENCOUNTER — APPOINTMENT (OUTPATIENT)
Dept: NEUROLOGY | Facility: MEDICAL CENTER | Age: 33
End: 2023-11-01
Attending: PSYCHIATRY & NEUROLOGY
Payer: COMMERCIAL

## 2023-11-01 NOTE — TELEPHONE ENCOUNTER
NURTEC 75 MG     PA renewal    Initiated PA in cmm Key: [id number]      Per CMM: This medication or product was previously approved on [id number] from 2023-01-27 to 2024-01-27. **Please note: This request was submitted electronically. Formulary lowering, tiering exception, cost reduction and/or pre-benefit determination review (including prospective Medicare hospice reviews) requests cannot be requested using this method of submission. Providers contact us at [phone] for further assistance.     called pt insurer at  [phone] to initiate PA renewal. spoke to Yesi stated there is an approval on file until 01/27/2024.     Stated  too soon for Renewal. we can do the renewal on 12/28/2023 per representative.

## 2024-01-04 NOTE — TELEPHONE ENCOUNTER
NURTEC 75 MG        PA Renewal    Initiated pa in [facility] (Key: [id number])    Prior Authorization for Nurtec has been approved for a quantity of 8 , day supply 30    Prior Authorization reference number: [id number]  Insurance: [employer]  Effective dates: 01/04/2024- 01/04/2025  Copay: $0     Is patient eligible to fill with [facility]? Yes    Next Steps: The Patients copay is less than $5.00. Will contact the patient to determine choice of pharmacy, if applicable.

## 2024-02-29 ENCOUNTER — OFFICE VISIT (OUTPATIENT)
Dept: URGENT CARE | Facility: PHYSICIAN GROUP | Age: 34
End: 2024-02-29
Payer: COMMERCIAL

## 2024-02-29 VITALS
HEIGHT: 61 IN | DIASTOLIC BLOOD PRESSURE: 82 MMHG | BODY MASS INDEX: 37.38 KG/M2 | TEMPERATURE: 97.8 F | SYSTOLIC BLOOD PRESSURE: 108 MMHG | HEART RATE: 80 BPM | RESPIRATION RATE: 16 BRPM | WEIGHT: 198 LBS | OXYGEN SATURATION: 96 %

## 2024-02-29 DIAGNOSIS — H92.01 RIGHT EAR PAIN: ICD-10-CM

## 2024-02-29 PROCEDURE — 1125F AMNT PAIN NOTED PAIN PRSNT: CPT | Performed by: NURSE PRACTITIONER

## 2024-02-29 PROCEDURE — 99213 OFFICE O/P EST LOW 20 MIN: CPT | Performed by: NURSE PRACTITIONER

## 2024-02-29 PROCEDURE — 3079F DIAST BP 80-89 MM HG: CPT | Performed by: NURSE PRACTITIONER

## 2024-02-29 PROCEDURE — 3074F SYST BP LT 130 MM HG: CPT | Performed by: NURSE PRACTITIONER

## 2024-02-29 ASSESSMENT — ENCOUNTER SYMPTOMS
FEVER: 0
CHILLS: 0
NAUSEA: 0
DIZZINESS: 0
COUGH: 0
HEADACHES: 0
DIARRHEA: 0

## 2024-02-29 ASSESSMENT — FIBROSIS 4 INDEX: FIB4 SCORE: 0.5

## 2024-02-29 ASSESSMENT — PAIN SCALES - GENERAL: PAINLEVEL: 1=MINIMAL PAIN

## 2024-02-29 NOTE — PROGRESS NOTES
Subjective     Kadi Dykes is a 33 y.o. female who presents with Otalgia (Right ear pain x5days)            HPI  New problem.  Patient is a 33-year-old female who presents with right-sided ear pain x 5 days.  She reportedly stuck a Q-tip in there pretty deep and since that time has felt a fullness in her ear.  She denies any congestion, cough, runny nose, fever, or chills.  She denies any discharge from the ear.  She has not taken any medications for this.    Hydrocodone-acetaminophen  Current Outpatient Medications on File Prior to Visit   Medication Sig Dispense Refill    acetaminophen (TYLENOL) 500 MG Tab Take 2 Tablets by mouth every 6 hours as needed for Mild Pain or Moderate Pain. 30 Tablet 2    docusate sodium 100 MG Cap Take 100 mg by mouth 2 times a day as needed for Constipation. 60 Capsule 2    ibuprofen (MOTRIN) 800 MG Tab Take 1 Tablet by mouth every 8 hours as needed for Mild Pain or Moderate Pain (cramping). 30 Tablet 2    Prenatal MV-Min-Fe Fum-FA-DHA (PRENATAL 1 PO) Take  by mouth. (Patient not taking: Reported on 4/24/2023)       No current facility-administered medications on file prior to visit.     Social History     Socioeconomic History    Marital status:      Spouse name: Not on file    Number of children: Not on file    Years of education: Not on file    Highest education level: Not on file   Occupational History    Not on file   Tobacco Use    Smoking status: Never    Smokeless tobacco: Never   Vaping Use    Vaping Use: Never used   Substance and Sexual Activity    Alcohol use: Not Currently    Drug use: Never    Sexual activity: Yes     Partners: Male   Other Topics Concern    Not on file   Social History Narrative    Not on file     Social Determinants of Health     Financial Resource Strain: Not on file   Food Insecurity: Unknown (3/7/2020)    Hunger Vital Sign     Worried About Running Out of Food in the Last Year: Patient declined     Ran Out of Food in the Last  Year: Patient declined   Transportation Needs: Unknown (3/7/2020)    PRAPARE - Transportation     Lack of Transportation (Medical): Patient declined     Lack of Transportation (Non-Medical): Patient declined   Physical Activity: Not on file   Stress: Not on file   Social Connections: Not on file   Intimate Partner Violence: Not on file   Housing Stability: Not on file     Breast Cancer-related family history is not on file.      Hydrocodone-acetaminophen  Current Outpatient Medications on File Prior to Visit   Medication Sig Dispense Refill    acetaminophen (TYLENOL) 500 MG Tab Take 2 Tablets by mouth every 6 hours as needed for Mild Pain or Moderate Pain. 30 Tablet 2    docusate sodium 100 MG Cap Take 100 mg by mouth 2 times a day as needed for Constipation. 60 Capsule 2    ibuprofen (MOTRIN) 800 MG Tab Take 1 Tablet by mouth every 8 hours as needed for Mild Pain or Moderate Pain (cramping). 30 Tablet 2    Prenatal MV-Min-Fe Fum-FA-DHA (PRENATAL 1 PO) Take  by mouth. (Patient not taking: Reported on 4/24/2023)       No current facility-administered medications on file prior to visit.     Social History     Socioeconomic History    Marital status:      Spouse name: Not on file    Number of children: Not on file    Years of education: Not on file    Highest education level: Not on file   Occupational History    Not on file   Tobacco Use    Smoking status: Never    Smokeless tobacco: Never   Vaping Use    Vaping Use: Never used   Substance and Sexual Activity    Alcohol use: Not Currently    Drug use: Never    Sexual activity: Yes     Partners: Male   Other Topics Concern    Not on file   Social History Narrative    Not on file     Social Determinants of Health     Financial Resource Strain: Not on file   Food Insecurity: Unknown (3/7/2020)    Hunger Vital Sign     Worried About Running Out of Food in the Last Year: Patient declined     Ran Out of Food in the Last Year: Patient declined   Transportation Needs:  "Unknown (3/7/2020)    PRAPARE - Transportation     Lack of Transportation (Medical): Patient declined     Lack of Transportation (Non-Medical): Patient declined   Physical Activity: Not on file   Stress: Not on file   Social Connections: Not on file   Intimate Partner Violence: Not on file   Housing Stability: Not on file     Breast Cancer-related family history is not on file.      Review of Systems   Constitutional:  Negative for chills, fever and malaise/fatigue.   HENT:  Positive for ear pain. Negative for congestion, ear discharge and hearing loss.    Respiratory:  Negative for cough.    Gastrointestinal:  Negative for diarrhea and nausea.   Neurological:  Negative for dizziness and headaches.              Objective     /82 (BP Location: Left arm, Patient Position: Sitting, BP Cuff Size: Adult)   Pulse 80   Temp 36.6 °C (97.8 °F) (Temporal)   Resp 16   Ht 1.549 m (5' 1\")   Wt 89.8 kg (198 lb)   SpO2 96%   BMI 37.41 kg/m²      Physical Exam  Vitals and nursing note reviewed.   Constitutional:       General: She is not in acute distress.     Appearance: She is well-developed.   HENT:      Head: Normocephalic and atraumatic.      Right Ear: Tympanic membrane, ear canal and external ear normal. No middle ear effusion. Tympanic membrane is not injected or perforated.      Left Ear: Tympanic membrane, ear canal and external ear normal.  No middle ear effusion. Tympanic membrane is not injected or perforated.      Nose: Mucosal edema present. No congestion.      Mouth/Throat:      Pharynx: No oropharyngeal exudate or posterior oropharyngeal erythema.   Eyes:      General:         Right eye: No discharge.         Left eye: No discharge.      Conjunctiva/sclera: Conjunctivae normal.   Cardiovascular:      Rate and Rhythm: Normal rate and regular rhythm.      Heart sounds: Normal heart sounds. No murmur heard.  Pulmonary:      Effort: Pulmonary effort is normal. No respiratory distress.      Breath sounds: " Normal breath sounds.   Musculoskeletal:         General: Normal range of motion.      Cervical back: Normal range of motion and neck supple.      Comments: Normal movement of all 4 extremities.   Lymphadenopathy:      Cervical: No cervical adenopathy.      Upper Body:      Right upper body: No supraclavicular adenopathy.      Left upper body: No supraclavicular adenopathy.   Skin:     General: Skin is warm and dry.   Neurological:      Mental Status: She is alert and oriented to person, place, and time.      Gait: Gait normal.   Psychiatric:         Behavior: Behavior normal.         Thought Content: Thought content normal.                             Assessment & Plan        1. Right ear pain          No infection seen.  Monitor symptoms.  Differential diagnosis, natural history, supportive care, and indications for immediate follow-up were discussed.